# Patient Record
Sex: FEMALE | Race: WHITE | NOT HISPANIC OR LATINO | Employment: OTHER | URBAN - METROPOLITAN AREA
[De-identification: names, ages, dates, MRNs, and addresses within clinical notes are randomized per-mention and may not be internally consistent; named-entity substitution may affect disease eponyms.]

---

## 2017-12-12 ENCOUNTER — TRANSCRIBE ORDERS (OUTPATIENT)
Dept: ADMINISTRATIVE | Facility: HOSPITAL | Age: 67
End: 2017-12-12

## 2017-12-12 DIAGNOSIS — Z12.31 VISIT FOR SCREENING MAMMOGRAM: Primary | ICD-10-CM

## 2017-12-14 ENCOUNTER — HOSPITAL ENCOUNTER (OUTPATIENT)
Dept: MAMMOGRAPHY | Facility: HOSPITAL | Age: 67
Discharge: HOME | End: 2017-12-14
Admitting: INTERNAL MEDICINE

## 2017-12-14 DIAGNOSIS — Z12.31 VISIT FOR SCREENING MAMMOGRAM: ICD-10-CM

## 2017-12-14 PROCEDURE — 77063 BREAST TOMOSYNTHESIS BI: CPT

## 2017-12-14 PROCEDURE — G0202 SCR MAMMO BI INCL CAD: HCPCS

## 2017-12-14 PROCEDURE — 77063 BREAST TOMOSYNTHESIS BI: CPT | Performed by: RADIOLOGY

## 2017-12-14 PROCEDURE — G0202 SCR MAMMO BI INCL CAD: HCPCS | Performed by: RADIOLOGY

## 2018-02-05 ENCOUNTER — HOSPITAL ENCOUNTER (OUTPATIENT)
Dept: ULTRASOUND IMAGING | Facility: HOSPITAL | Age: 68
Discharge: HOME | End: 2018-02-05

## 2018-02-05 ENCOUNTER — HOSPITAL ENCOUNTER (OUTPATIENT)
Dept: MAMMOGRAPHY | Facility: HOSPITAL | Age: 68
Discharge: HOME | End: 2018-02-05
Admitting: INTERNAL MEDICINE

## 2018-02-05 ENCOUNTER — TRANSCRIBE ORDERS (OUTPATIENT)
Dept: MAMMOGRAPHY | Facility: HOSPITAL | Age: 68
End: 2018-02-05

## 2018-02-05 DIAGNOSIS — R92.8 ABNORMAL MAMMOGRAM: Primary | ICD-10-CM

## 2018-02-05 DIAGNOSIS — R92.8 ABNORMAL MAMMOGRAM: ICD-10-CM

## 2018-02-05 PROCEDURE — G0279 TOMOSYNTHESIS, MAMMO: HCPCS

## 2018-02-05 PROCEDURE — G0279 TOMOSYNTHESIS, MAMMO: HCPCS | Performed by: RADIOLOGY

## 2018-02-05 PROCEDURE — 76642 ULTRASOUND BREAST LIMITED: CPT | Performed by: RADIOLOGY

## 2018-02-05 PROCEDURE — 76642 ULTRASOUND BREAST LIMITED: CPT

## 2018-02-05 PROCEDURE — 77066 DX MAMMO INCL CAD BI: CPT | Performed by: RADIOLOGY

## 2018-02-05 PROCEDURE — 77066 DX MAMMO INCL CAD BI: CPT

## 2018-08-07 ENCOUNTER — TRANSCRIBE ORDERS (OUTPATIENT)
Dept: MAMMOGRAPHY | Facility: HOSPITAL | Age: 68
End: 2018-08-07

## 2018-08-07 ENCOUNTER — HOSPITAL ENCOUNTER (OUTPATIENT)
Dept: MAMMOGRAPHY | Facility: HOSPITAL | Age: 68
Discharge: HOME | End: 2018-08-07
Admitting: INTERNAL MEDICINE

## 2018-08-07 DIAGNOSIS — R92.8 ABNORMAL MAMMOGRAM: Primary | ICD-10-CM

## 2018-08-07 DIAGNOSIS — R92.8 ABNORMAL MAMMOGRAM: ICD-10-CM

## 2018-08-07 PROCEDURE — 77066 DX MAMMO INCL CAD BI: CPT | Performed by: RADIOLOGY

## 2018-08-07 PROCEDURE — 77066 DX MAMMO INCL CAD BI: CPT

## 2019-02-08 ENCOUNTER — APPOINTMENT (OUTPATIENT)
Dept: MAMMOGRAPHY | Facility: HOSPITAL | Age: 69
End: 2019-02-08
Attending: RADIOLOGY

## 2019-05-01 ENCOUNTER — HOSPITAL ENCOUNTER (OUTPATIENT)
Dept: MAMMOGRAPHY | Facility: HOSPITAL | Age: 69
Discharge: HOME | End: 2019-05-01
Attending: RADIOLOGY | Admitting: RADIOLOGY

## 2019-05-01 DIAGNOSIS — R92.8 ABNORMAL MAMMOGRAM: ICD-10-CM

## 2019-05-01 PROCEDURE — 77066 DX MAMMO INCL CAD BI: CPT

## 2019-05-01 PROCEDURE — G0279 TOMOSYNTHESIS, MAMMO: HCPCS | Performed by: RADIOLOGY

## 2019-05-01 PROCEDURE — G0279 TOMOSYNTHESIS, MAMMO: HCPCS

## 2019-05-01 PROCEDURE — 77066 DX MAMMO INCL CAD BI: CPT | Performed by: RADIOLOGY

## 2020-08-10 ENCOUNTER — TRANSCRIBE ORDERS (OUTPATIENT)
Dept: ADMINISTRATIVE | Facility: HOSPITAL | Age: 70
End: 2020-08-10

## 2020-08-10 DIAGNOSIS — R92.8 ABNORMAL MAMMOGRAPHY: Primary | ICD-10-CM

## 2020-10-09 ENCOUNTER — APPOINTMENT (OUTPATIENT)
Dept: PREADMISSION TESTING | Facility: HOSPITAL | Age: 70
End: 2020-10-09

## 2020-10-13 ENCOUNTER — HOSPITAL ENCOUNTER (OUTPATIENT)
Dept: MAMMOGRAPHY | Facility: HOSPITAL | Age: 70
Discharge: HOME | End: 2020-10-13
Admitting: INTERNAL MEDICINE

## 2020-10-13 DIAGNOSIS — R92.8 ABNORMAL MAMMOGRAPHY: ICD-10-CM

## 2020-10-13 PROCEDURE — G0279 TOMOSYNTHESIS, MAMMO: HCPCS | Performed by: RADIOLOGY

## 2020-10-13 PROCEDURE — G0279 TOMOSYNTHESIS, MAMMO: HCPCS

## 2020-10-13 PROCEDURE — 77066 DX MAMMO INCL CAD BI: CPT

## 2020-10-13 PROCEDURE — 77066 DX MAMMO INCL CAD BI: CPT | Performed by: RADIOLOGY

## 2020-10-14 ENCOUNTER — HOSPITAL ENCOUNTER (OUTPATIENT)
Dept: GENERAL RADIOLOGY | Facility: HOSPITAL | Age: 70
Discharge: HOME | End: 2020-10-14

## 2020-10-14 ENCOUNTER — APPOINTMENT (OUTPATIENT)
Dept: PREADMISSION TESTING | Facility: HOSPITAL | Age: 70
End: 2020-10-14

## 2020-10-14 VITALS — BODY MASS INDEX: 36.65 KG/M2 | HEIGHT: 65 IN | WEIGHT: 220 LBS

## 2020-10-14 LAB
ANION GAP SERPL CALCULATED.3IONS-SCNC: 9 MMOL/L (ref 5–15)
BUN SERPL-MCNC: 15 MG/DL (ref 8–23)
BUN/CREAT SERPL: 19 (ref 7–25)
CALCIUM SPEC-SCNC: 10.3 MG/DL (ref 8.6–10.5)
CHLORIDE SERPL-SCNC: 101 MMOL/L (ref 98–107)
CO2 SERPL-SCNC: 30 MMOL/L (ref 22–29)
CREAT SERPL-MCNC: 0.79 MG/DL (ref 0.57–1)
DEPRECATED RDW RBC AUTO: 50.9 FL (ref 37–54)
ERYTHROCYTE [DISTWIDTH] IN BLOOD BY AUTOMATED COUNT: 12.7 % (ref 12.3–15.4)
GFR SERPL CREATININE-BSD FRML MDRD: 72 ML/MIN/1.73
GLUCOSE SERPL-MCNC: 69 MG/DL (ref 65–99)
HBA1C MFR BLD: 5.4 % (ref 4.8–5.6)
HCT VFR BLD AUTO: 41 % (ref 34–46.6)
HGB BLD-MCNC: 13.7 G/DL (ref 12–15.9)
MCH RBC QN AUTO: 36.1 PG (ref 26.6–33)
MCHC RBC AUTO-ENTMCNC: 33.4 G/DL (ref 31.5–35.7)
MCV RBC AUTO: 107.9 FL (ref 79–97)
PLATELET # BLD AUTO: 228 10*3/MM3 (ref 140–450)
PMV BLD AUTO: 9.9 FL (ref 6–12)
POTASSIUM SERPL-SCNC: 4.5 MMOL/L (ref 3.5–5.2)
RBC # BLD AUTO: 3.8 10*6/MM3 (ref 3.77–5.28)
SODIUM SERPL-SCNC: 140 MMOL/L (ref 136–145)
WBC # BLD AUTO: 6.38 10*3/MM3 (ref 3.4–10.8)

## 2020-10-14 PROCEDURE — 93010 ELECTROCARDIOGRAM REPORT: CPT | Performed by: INTERNAL MEDICINE

## 2020-10-14 PROCEDURE — 71046 X-RAY EXAM CHEST 2 VIEWS: CPT

## 2020-10-14 PROCEDURE — 36415 COLL VENOUS BLD VENIPUNCTURE: CPT

## 2020-10-14 PROCEDURE — 80048 BASIC METABOLIC PNL TOTAL CA: CPT | Performed by: ORTHOPAEDIC SURGERY

## 2020-10-14 PROCEDURE — 85027 COMPLETE CBC AUTOMATED: CPT | Performed by: ORTHOPAEDIC SURGERY

## 2020-10-14 PROCEDURE — 83036 HEMOGLOBIN GLYCOSYLATED A1C: CPT | Performed by: ORTHOPAEDIC SURGERY

## 2020-10-14 PROCEDURE — 93005 ELECTROCARDIOGRAM TRACING: CPT

## 2020-10-14 RX ORDER — PREGABALIN 150 MG/1
150 CAPSULE ORAL 2 TIMES DAILY
COMMUNITY

## 2020-10-14 RX ORDER — IRBESARTAN AND HYDROCHLOROTHIAZIDE 300; 12.5 MG/1; MG/1
1 TABLET, FILM COATED ORAL DAILY
COMMUNITY

## 2020-10-14 RX ORDER — OXYCODONE AND ACETAMINOPHEN 7.5; 325 MG/1; MG/1
1 TABLET ORAL EVERY 6 HOURS PRN
COMMUNITY
End: 2020-10-23 | Stop reason: HOSPADM

## 2020-10-14 RX ORDER — CELECOXIB 200 MG/1
200 CAPSULE ORAL 2 TIMES DAILY
Status: ON HOLD | COMMUNITY
End: 2020-10-23 | Stop reason: SDUPTHER

## 2020-10-14 RX ORDER — TIZANIDINE 4 MG/1
4 TABLET ORAL NIGHTLY PRN
COMMUNITY

## 2020-10-14 RX ORDER — DULOXETIN HYDROCHLORIDE 60 MG/1
60 CAPSULE, DELAYED RELEASE ORAL DAILY
COMMUNITY

## 2020-10-14 RX ORDER — FENOFIBRATE 160 MG/1
160 TABLET ORAL DAILY
COMMUNITY

## 2020-10-14 RX ORDER — CHLORAL HYDRATE 500 MG
1000 CAPSULE ORAL
COMMUNITY

## 2020-10-14 RX ORDER — MULTIVIT WITH MINERALS/LUTEIN
1000 TABLET ORAL DAILY
COMMUNITY

## 2020-10-14 RX ORDER — POTASSIUM CHLORIDE 20 MEQ/1
20 TABLET, EXTENDED RELEASE ORAL DAILY
COMMUNITY

## 2020-10-14 RX ORDER — VALACYCLOVIR HYDROCHLORIDE 500 MG/1
500 TABLET, FILM COATED ORAL 2 TIMES DAILY
COMMUNITY

## 2020-10-14 RX ORDER — OMEPRAZOLE 40 MG/1
40 CAPSULE, DELAYED RELEASE ORAL DAILY
COMMUNITY

## 2020-10-14 ASSESSMENT — KOOS JR
KOOS JR SCORE: 39.625
KOOS JR SCORE: 19

## 2020-10-14 NOTE — PAT
Patient to apply Chlorhexadine wipes  to surgical area (as instructed) the night before procedure and the AM of procedure. Wipes provided.    Patient instructed to drink 20 ounces (or until full) of Gatorade and it needs to be completed 1 hour before given arrival time for procedure (NO RED Gatorade)    Patient verbalized understanding.    Per Anesthesia Request, patient instructed not to take their ACE/ARB medications on the AM of surgery.    Patient did not attend joint class during PAT visit because the in person class has been temporarily suspended due to COVID 19 restrictions.   Joint replacement handbook given to patient during PAT visit if they have not received a copy within the last one to two years.  Encouraged patient/family to read handbook thoroughly and to notify PAT staff with any questions or concerns.  Additionally a handout was provided directing patient to links to watch online videos related to joint replacement surgery on the [facility] website.  The handout also gives detailed instructions for joining an online joint replacement class that is offered on Tuesdays and Thursdays.  If patient agreed to watch the joint replacement teaching videos and/or join an online joint replacement class then joint replacement instruction guide not reviewed. But if patient would not agree to watch videos or join a class, the nurse guide that reviews highlights of the joint replacement class was reviewed with patient during PAT visit. Patient verbalized understanding.     EKG reviewed and cleared by Dr. Romero

## 2020-10-20 ENCOUNTER — APPOINTMENT (OUTPATIENT)
Dept: PREADMISSION TESTING | Facility: HOSPITAL | Age: 70
End: 2020-10-20

## 2020-10-20 LAB — SARS-COV-2 RNA RESP QL NAA+PROBE: NOT DETECTED

## 2020-10-20 PROCEDURE — C9803 HOPD COVID-19 SPEC COLLECT: HCPCS

## 2020-10-20 PROCEDURE — U0004 COV-19 TEST NON-CDC HGH THRU: HCPCS

## 2020-10-21 ENCOUNTER — ANESTHESIA EVENT (OUTPATIENT)
Dept: PERIOP | Facility: HOSPITAL | Age: 70
End: 2020-10-21

## 2020-10-21 RX ORDER — SODIUM CHLORIDE 0.9 % (FLUSH) 0.9 %
10 SYRINGE (ML) INJECTION AS NEEDED
Status: CANCELLED | OUTPATIENT
Start: 2020-10-21

## 2020-10-21 RX ORDER — SODIUM CHLORIDE 0.9 % (FLUSH) 0.9 %
10 SYRINGE (ML) INJECTION EVERY 12 HOURS SCHEDULED
Status: CANCELLED | OUTPATIENT
Start: 2020-10-21

## 2020-10-21 RX ORDER — FAMOTIDINE 10 MG/ML
20 INJECTION, SOLUTION INTRAVENOUS ONCE
Status: CANCELLED | OUTPATIENT
Start: 2020-10-21 | End: 2020-10-21

## 2020-10-22 ENCOUNTER — HOSPITAL ENCOUNTER (OUTPATIENT)
Facility: HOSPITAL | Age: 70
Discharge: HOME | End: 2020-10-23
Attending: ORTHOPAEDIC SURGERY | Admitting: ORTHOPAEDIC SURGERY

## 2020-10-22 ENCOUNTER — ANESTHESIA (OUTPATIENT)
Dept: PERIOP | Facility: HOSPITAL | Age: 70
End: 2020-10-22

## 2020-10-22 ENCOUNTER — APPOINTMENT (OUTPATIENT)
Dept: GENERAL RADIOLOGY | Facility: HOSPITAL | Age: 70
End: 2020-10-22

## 2020-10-22 DIAGNOSIS — Z96.652 STATUS POST TOTAL LEFT KNEE REPLACEMENT: Primary | ICD-10-CM

## 2020-10-22 PROBLEM — K21.9 GERD (GASTROESOPHAGEAL REFLUX DISEASE): Status: ACTIVE | Noted: 2020-10-22

## 2020-10-22 PROBLEM — M17.12 PRIMARY OSTEOARTHRITIS OF LEFT KNEE: Status: ACTIVE | Noted: 2020-10-22

## 2020-10-22 PROBLEM — E78.5 HYPERLIPIDEMIA: Status: ACTIVE | Noted: 2020-10-22

## 2020-10-22 PROBLEM — I10 HYPERTENSION: Status: ACTIVE | Noted: 2020-10-22

## 2020-10-22 LAB
POTASSIUM SERPL-SCNC: 4.1 MMOL/L (ref 3.5–5.2)
POTASSIUM SERPL-SCNC: 4.7 MMOL/L (ref 3.5–5.2)

## 2020-10-22 PROCEDURE — 25010000002 PROPOFOL 10 MG/ML EMULSION: Performed by: NURSE ANESTHETIST, CERTIFIED REGISTERED

## 2020-10-22 PROCEDURE — C1713 ANCHOR/SCREW BN/BN,TIS/BN: HCPCS | Performed by: ORTHOPAEDIC SURGERY

## 2020-10-22 PROCEDURE — 25010000002 DEXAMETHASONE PER 1 MG: Performed by: NURSE ANESTHETIST, CERTIFIED REGISTERED

## 2020-10-22 PROCEDURE — 25010000003 CEFAZOLIN IN DEXTROSE 2-4 GM/100ML-% SOLUTION: Performed by: ORTHOPAEDIC SURGERY

## 2020-10-22 PROCEDURE — A9270 NON-COVERED ITEM OR SERVICE: HCPCS | Performed by: ORTHOPAEDIC SURGERY

## 2020-10-22 PROCEDURE — 63710000001 PREGABALIN 150 MG CAPSULE: Performed by: INTERNAL MEDICINE

## 2020-10-22 PROCEDURE — A9270 NON-COVERED ITEM OR SERVICE: HCPCS | Performed by: NURSE PRACTITIONER

## 2020-10-22 PROCEDURE — C1776 JOINT DEVICE (IMPLANTABLE): HCPCS | Performed by: ORTHOPAEDIC SURGERY

## 2020-10-22 PROCEDURE — 63710000001 LOSARTAN 50 MG TABLET: Performed by: NURSE PRACTITIONER

## 2020-10-22 PROCEDURE — 25010000003 MORPHINE PER 10 MG: Performed by: ORTHOPAEDIC SURGERY

## 2020-10-22 PROCEDURE — 97116 GAIT TRAINING THERAPY: CPT

## 2020-10-22 PROCEDURE — A9270 NON-COVERED ITEM OR SERVICE: HCPCS | Performed by: INTERNAL MEDICINE

## 2020-10-22 PROCEDURE — 84132 ASSAY OF SERUM POTASSIUM: CPT | Performed by: ANESTHESIOLOGY

## 2020-10-22 PROCEDURE — 63710000001 PANTOPRAZOLE 40 MG TABLET DELAYED-RELEASE: Performed by: NURSE PRACTITIONER

## 2020-10-22 PROCEDURE — 97161 PT EVAL LOW COMPLEX 20 MIN: CPT

## 2020-10-22 PROCEDURE — 73560 X-RAY EXAM OF KNEE 1 OR 2: CPT

## 2020-10-22 PROCEDURE — 25010000002 ROPIVACAINE PER 1 MG: Performed by: ORTHOPAEDIC SURGERY

## 2020-10-22 PROCEDURE — 25010000002 HYDROMORPHONE PER 4 MG: Performed by: NURSE ANESTHETIST, CERTIFIED REGISTERED

## 2020-10-22 PROCEDURE — 25010000002 ONDANSETRON PER 1 MG: Performed by: NURSE ANESTHETIST, CERTIFIED REGISTERED

## 2020-10-22 PROCEDURE — 63710000001 ACETAMINOPHEN 500 MG TABLET: Performed by: ORTHOPAEDIC SURGERY

## 2020-10-22 PROCEDURE — 84132 ASSAY OF SERUM POTASSIUM: CPT | Performed by: ORTHOPAEDIC SURGERY

## 2020-10-22 PROCEDURE — 25010000002 ROPIVACAINE PER 1 MG: Performed by: NURSE ANESTHETIST, CERTIFIED REGISTERED

## 2020-10-22 PROCEDURE — 63710000001 DULOXETINE 60 MG CAPSULE DELAYED-RELEASE PARTICLES: Performed by: NURSE PRACTITIONER

## 2020-10-22 PROCEDURE — 63710000001 OXYCODONE 5 MG TABLET: Performed by: ORTHOPAEDIC SURGERY

## 2020-10-22 PROCEDURE — 63710000001 MELOXICAM 7.5 MG TABLET: Performed by: ORTHOPAEDIC SURGERY

## 2020-10-22 DEVICE — DEV CONTRL TISS STRATAFIX SPIRAL PGPCL 2/0FS 30X30CM: Type: IMPLANTABLE DEVICE | Site: KNEE | Status: FUNCTIONAL

## 2020-10-22 DEVICE — CMT BONE PALACOS R HI/VISC 1X40: Type: IMPLANTABLE DEVICE | Site: KNEE | Status: FUNCTIONAL

## 2020-10-22 DEVICE — GEN II 7.5MM RESUR PAT 29MM
Type: IMPLANTABLE DEVICE | Site: KNEE | Status: FUNCTIONAL
Brand: GENESIS II

## 2020-10-22 DEVICE — LEGION PS OXINIUM FEMORAL SZ 4 LEFT
Type: IMPLANTABLE DEVICE | Site: KNEE | Status: FUNCTIONAL
Brand: LEGION

## 2020-10-22 DEVICE — GENESIS II NON-POROUS TIBIAL                                    BASEPLATE SIZE 3 LEFT
Type: IMPLANTABLE DEVICE | Site: KNEE | Status: FUNCTIONAL
Brand: GENESIS II

## 2020-10-22 DEVICE — IMPLANTABLE DEVICE: Type: IMPLANTABLE DEVICE | Site: KNEE | Status: FUNCTIONAL

## 2020-10-22 DEVICE — DEV CONTRL TISS STRATAFIX SYMM PDS PLUS VIL CT-1 45CM: Type: IMPLANTABLE DEVICE | Site: KNEE | Status: FUNCTIONAL

## 2020-10-22 DEVICE — LEGION POSTERIOR STABILIZED HIGH                                    FLEX HIGHLY CROSS LINKED                                    POLYETHYLENE SIZE 3-4 9MM
Type: IMPLANTABLE DEVICE | Site: KNEE | Status: FUNCTIONAL
Brand: LEGION

## 2020-10-22 RX ORDER — BUPIVACAINE HYDROCHLORIDE 2.5 MG/ML
INJECTION, SOLUTION EPIDURAL; INFILTRATION; INTRACAUDAL
Status: COMPLETED | OUTPATIENT
Start: 2020-10-22 | End: 2020-10-22

## 2020-10-22 RX ORDER — ACETAMINOPHEN 500 MG
1000 TABLET ORAL 3 TIMES DAILY
Status: DISCONTINUED | OUTPATIENT
Start: 2020-10-22 | End: 2020-10-23 | Stop reason: HOSPADM

## 2020-10-22 RX ORDER — BISACODYL 5 MG/1
10 TABLET, DELAYED RELEASE ORAL DAILY PRN
Status: DISCONTINUED | OUTPATIENT
Start: 2020-10-22 | End: 2020-10-23 | Stop reason: HOSPADM

## 2020-10-22 RX ORDER — FENTANYL CITRATE 50 UG/ML
50 INJECTION, SOLUTION INTRAMUSCULAR; INTRAVENOUS
Status: DISCONTINUED | OUTPATIENT
Start: 2020-10-22 | End: 2020-10-22

## 2020-10-22 RX ORDER — OXYCODONE HYDROCHLORIDE 10 MG/1
10 TABLET ORAL EVERY 4 HOURS PRN
Qty: 40 TABLET | Refills: 0 | Status: SHIPPED | OUTPATIENT
Start: 2020-10-22 | End: 2020-10-30

## 2020-10-22 RX ORDER — ONDANSETRON 2 MG/ML
INJECTION INTRAMUSCULAR; INTRAVENOUS AS NEEDED
Status: DISCONTINUED | OUTPATIENT
Start: 2020-10-22 | End: 2020-10-22 | Stop reason: SURG

## 2020-10-22 RX ORDER — BUPIVACAINE HYDROCHLORIDE 5 MG/ML
INJECTION, SOLUTION PERINEURAL
Status: COMPLETED | OUTPATIENT
Start: 2020-10-22 | End: 2020-10-22

## 2020-10-22 RX ORDER — AMOXICILLIN 250 MG
2 CAPSULE ORAL 2 TIMES DAILY PRN
Status: DISCONTINUED | OUTPATIENT
Start: 2020-10-22 | End: 2020-10-23 | Stop reason: HOSPADM

## 2020-10-22 RX ORDER — LIDOCAINE HYDROCHLORIDE 10 MG/ML
0.5 INJECTION, SOLUTION EPIDURAL; INFILTRATION; INTRACAUDAL; PERINEURAL ONCE AS NEEDED
Status: COMPLETED | OUTPATIENT
Start: 2020-10-22 | End: 2020-10-22

## 2020-10-22 RX ORDER — MELOXICAM 7.5 MG/1
15 TABLET ORAL DAILY
Status: DISCONTINUED | OUTPATIENT
Start: 2020-10-22 | End: 2020-10-23 | Stop reason: HOSPADM

## 2020-10-22 RX ORDER — PREGABALIN 75 MG/1
75 CAPSULE ORAL ONCE
Status: COMPLETED | OUTPATIENT
Start: 2020-10-22 | End: 2020-10-22

## 2020-10-22 RX ORDER — SODIUM CHLORIDE 9 MG/ML
100 INJECTION, SOLUTION INTRAVENOUS CONTINUOUS
Status: DISCONTINUED | OUTPATIENT
Start: 2020-10-22 | End: 2020-10-23 | Stop reason: HOSPADM

## 2020-10-22 RX ORDER — CEFAZOLIN SODIUM 2 G/100ML
2 INJECTION, SOLUTION INTRAVENOUS ONCE
Status: COMPLETED | OUTPATIENT
Start: 2020-10-22 | End: 2020-10-22

## 2020-10-22 RX ORDER — ASPIRIN 325 MG
325 TABLET, DELAYED RELEASE (ENTERIC COATED) ORAL DAILY
Status: DISCONTINUED | OUTPATIENT
Start: 2020-10-23 | End: 2020-10-23 | Stop reason: HOSPADM

## 2020-10-22 RX ORDER — ONDANSETRON 2 MG/ML
4 INJECTION INTRAMUSCULAR; INTRAVENOUS EVERY 6 HOURS PRN
Status: DISCONTINUED | OUTPATIENT
Start: 2020-10-22 | End: 2020-10-23 | Stop reason: HOSPADM

## 2020-10-22 RX ORDER — LABETALOL HYDROCHLORIDE 5 MG/ML
10 INJECTION, SOLUTION INTRAVENOUS EVERY 4 HOURS PRN
Status: DISCONTINUED | OUTPATIENT
Start: 2020-10-22 | End: 2020-10-23 | Stop reason: HOSPADM

## 2020-10-22 RX ORDER — DOCUSATE SODIUM 100 MG/1
100 CAPSULE, LIQUID FILLED ORAL 2 TIMES DAILY PRN
Status: DISCONTINUED | OUTPATIENT
Start: 2020-10-22 | End: 2020-10-23 | Stop reason: HOSPADM

## 2020-10-22 RX ORDER — OXYCODONE HYDROCHLORIDE 5 MG/1
10 TABLET ORAL EVERY 4 HOURS PRN
Status: DISCONTINUED | OUTPATIENT
Start: 2020-10-22 | End: 2020-10-23 | Stop reason: HOSPADM

## 2020-10-22 RX ORDER — NALOXONE HCL 0.4 MG/ML
0.1 VIAL (ML) INJECTION
Status: DISCONTINUED | OUTPATIENT
Start: 2020-10-22 | End: 2020-10-23 | Stop reason: HOSPADM

## 2020-10-22 RX ORDER — SODIUM CHLORIDE, SODIUM LACTATE, POTASSIUM CHLORIDE, CALCIUM CHLORIDE 600; 310; 30; 20 MG/100ML; MG/100ML; MG/100ML; MG/100ML
9 INJECTION, SOLUTION INTRAVENOUS CONTINUOUS
Status: DISCONTINUED | OUTPATIENT
Start: 2020-10-22 | End: 2020-10-23 | Stop reason: HOSPADM

## 2020-10-22 RX ORDER — LOSARTAN POTASSIUM 50 MG/1
100 TABLET ORAL
Status: DISCONTINUED | OUTPATIENT
Start: 2020-10-22 | End: 2020-10-23 | Stop reason: HOSPADM

## 2020-10-22 RX ORDER — HYDROMORPHONE HYDROCHLORIDE 1 MG/ML
0.5 INJECTION, SOLUTION INTRAMUSCULAR; INTRAVENOUS; SUBCUTANEOUS
Status: DISCONTINUED | OUTPATIENT
Start: 2020-10-22 | End: 2020-10-22

## 2020-10-22 RX ORDER — DIPHENHYDRAMINE HYDROCHLORIDE 50 MG/ML
25 INJECTION INTRAMUSCULAR; INTRAVENOUS EVERY 6 HOURS PRN
Status: DISCONTINUED | OUTPATIENT
Start: 2020-10-22 | End: 2020-10-23 | Stop reason: HOSPADM

## 2020-10-22 RX ORDER — TIZANIDINE 4 MG/1
4 TABLET ORAL NIGHTLY PRN
Status: DISCONTINUED | OUTPATIENT
Start: 2020-10-22 | End: 2020-10-23 | Stop reason: HOSPADM

## 2020-10-22 RX ORDER — PROPOFOL 10 MG/ML
VIAL (ML) INTRAVENOUS AS NEEDED
Status: DISCONTINUED | OUTPATIENT
Start: 2020-10-22 | End: 2020-10-22 | Stop reason: SURG

## 2020-10-22 RX ORDER — ONDANSETRON 4 MG/1
4 TABLET, FILM COATED ORAL EVERY 6 HOURS PRN
Status: DISCONTINUED | OUTPATIENT
Start: 2020-10-22 | End: 2020-10-23 | Stop reason: HOSPADM

## 2020-10-22 RX ORDER — KETOROLAC TROMETHAMINE 15 MG/ML
15 INJECTION, SOLUTION INTRAMUSCULAR; INTRAVENOUS EVERY 6 HOURS PRN
Status: DISCONTINUED | OUTPATIENT
Start: 2020-10-22 | End: 2020-10-23 | Stop reason: HOSPADM

## 2020-10-22 RX ORDER — BISACODYL 10 MG
10 SUPPOSITORY, RECTAL RECTAL DAILY PRN
Status: DISCONTINUED | OUTPATIENT
Start: 2020-10-22 | End: 2020-10-23 | Stop reason: HOSPADM

## 2020-10-22 RX ORDER — DIPHENHYDRAMINE HCL 25 MG
25 CAPSULE ORAL EVERY 6 HOURS PRN
Status: DISCONTINUED | OUTPATIENT
Start: 2020-10-22 | End: 2020-10-23 | Stop reason: HOSPADM

## 2020-10-22 RX ORDER — DULOXETIN HYDROCHLORIDE 60 MG/1
60 CAPSULE, DELAYED RELEASE ORAL DAILY
Status: DISCONTINUED | OUTPATIENT
Start: 2020-10-22 | End: 2020-10-23 | Stop reason: HOSPADM

## 2020-10-22 RX ORDER — MAGNESIUM HYDROXIDE 1200 MG/15ML
LIQUID ORAL AS NEEDED
Status: DISCONTINUED | OUTPATIENT
Start: 2020-10-22 | End: 2020-10-22 | Stop reason: HOSPADM

## 2020-10-22 RX ORDER — PANTOPRAZOLE SODIUM 40 MG/1
40 TABLET, DELAYED RELEASE ORAL EVERY MORNING
Status: DISCONTINUED | OUTPATIENT
Start: 2020-10-22 | End: 2020-10-23 | Stop reason: HOSPADM

## 2020-10-22 RX ORDER — PREGABALIN 150 MG/1
150 CAPSULE ORAL EVERY 12 HOURS SCHEDULED
Status: DISCONTINUED | OUTPATIENT
Start: 2020-10-22 | End: 2020-10-23 | Stop reason: HOSPADM

## 2020-10-22 RX ORDER — ALBUTEROL SULFATE 2.5 MG/3ML
2.5 SOLUTION RESPIRATORY (INHALATION) ONCE AS NEEDED
Status: DISCONTINUED | OUTPATIENT
Start: 2020-10-22 | End: 2020-10-22

## 2020-10-22 RX ORDER — CEFAZOLIN SODIUM 2 G/100ML
2 INJECTION, SOLUTION INTRAVENOUS EVERY 8 HOURS
Status: COMPLETED | OUTPATIENT
Start: 2020-10-22 | End: 2020-10-23

## 2020-10-22 RX ORDER — DEXAMETHASONE SODIUM PHOSPHATE 4 MG/ML
INJECTION, SOLUTION INTRA-ARTICULAR; INTRALESIONAL; INTRAMUSCULAR; INTRAVENOUS; SOFT TISSUE AS NEEDED
Status: DISCONTINUED | OUTPATIENT
Start: 2020-10-22 | End: 2020-10-22 | Stop reason: SURG

## 2020-10-22 RX ORDER — ACETAMINOPHEN 500 MG
1000 TABLET ORAL ONCE
Status: COMPLETED | OUTPATIENT
Start: 2020-10-22 | End: 2020-10-22

## 2020-10-22 RX ORDER — LIDOCAINE HYDROCHLORIDE 10 MG/ML
INJECTION, SOLUTION EPIDURAL; INFILTRATION; INTRACAUDAL; PERINEURAL AS NEEDED
Status: DISCONTINUED | OUTPATIENT
Start: 2020-10-22 | End: 2020-10-22 | Stop reason: SURG

## 2020-10-22 RX ORDER — OXYCODONE HYDROCHLORIDE 5 MG/1
5 TABLET ORAL EVERY 4 HOURS PRN
Status: DISCONTINUED | OUTPATIENT
Start: 2020-10-22 | End: 2020-10-23 | Stop reason: HOSPADM

## 2020-10-22 RX ORDER — FAMOTIDINE 20 MG/1
20 TABLET, FILM COATED ORAL ONCE
Status: COMPLETED | OUTPATIENT
Start: 2020-10-22 | End: 2020-10-22

## 2020-10-22 RX ADMIN — TRANEXAMIC ACID 1000 MG: 100 INJECTION, SOLUTION INTRAVENOUS at 08:36

## 2020-10-22 RX ADMIN — HYDROMORPHONE HYDROCHLORIDE 0.5 MG: 1 INJECTION, SOLUTION INTRAMUSCULAR; INTRAVENOUS; SUBCUTANEOUS at 10:30

## 2020-10-22 RX ADMIN — PROPOFOL 40 MG: 10 INJECTION, EMULSION INTRAVENOUS at 07:33

## 2020-10-22 RX ADMIN — LOSARTAN POTASSIUM 100 MG: 50 TABLET, FILM COATED ORAL at 13:40

## 2020-10-22 RX ADMIN — PROPOFOL 60 MG: 10 INJECTION, EMULSION INTRAVENOUS at 07:30

## 2020-10-22 RX ADMIN — SODIUM CHLORIDE, POTASSIUM CHLORIDE, SODIUM LACTATE AND CALCIUM CHLORIDE 9 ML/HR: 600; 310; 30; 20 INJECTION, SOLUTION INTRAVENOUS at 06:57

## 2020-10-22 RX ADMIN — FAMOTIDINE 20 MG: 20 TABLET, FILM COATED ORAL at 06:57

## 2020-10-22 RX ADMIN — LIDOCAINE HYDROCHLORIDE 50 MG: 10 INJECTION, SOLUTION EPIDURAL; INFILTRATION; INTRACAUDAL; PERINEURAL at 07:30

## 2020-10-22 RX ADMIN — ROPIVACAINE HYDROCHLORIDE 10 ML/HR: 5 INJECTION, SOLUTION EPIDURAL; INFILTRATION; PERINEURAL at 09:16

## 2020-10-22 RX ADMIN — DEXAMETHASONE SODIUM PHOSPHATE 8 MG: 4 INJECTION, SOLUTION INTRAMUSCULAR; INTRAVENOUS at 07:43

## 2020-10-22 RX ADMIN — ONDANSETRON 4 MG: 2 INJECTION INTRAMUSCULAR; INTRAVENOUS at 08:47

## 2020-10-22 RX ADMIN — PREGABALIN 150 MG: 150 CAPSULE ORAL at 13:40

## 2020-10-22 RX ADMIN — SODIUM CHLORIDE 100 ML/HR: 9 INJECTION, SOLUTION INTRAVENOUS at 11:44

## 2020-10-22 RX ADMIN — PREGABALIN 75 MG: 75 CAPSULE ORAL at 06:57

## 2020-10-22 RX ADMIN — CEFAZOLIN SODIUM 2 G: 2 INJECTION, SOLUTION INTRAVENOUS at 07:26

## 2020-10-22 RX ADMIN — PANTOPRAZOLE SODIUM 40 MG: 40 TABLET, DELAYED RELEASE ORAL at 13:40

## 2020-10-22 RX ADMIN — ACETAMINOPHEN 1000 MG: 500 TABLET, FILM COATED ORAL at 21:41

## 2020-10-22 RX ADMIN — TRANEXAMIC ACID 1000 MG: 100 INJECTION, SOLUTION INTRAVENOUS at 07:39

## 2020-10-22 RX ADMIN — HYDROMORPHONE HYDROCHLORIDE 0.5 MG: 1 INJECTION, SOLUTION INTRAMUSCULAR; INTRAVENOUS; SUBCUTANEOUS at 10:24

## 2020-10-22 RX ADMIN — MELOXICAM 15 MG: 7.5 TABLET ORAL at 12:12

## 2020-10-22 RX ADMIN — OXYCODONE 10 MG: 5 TABLET ORAL at 13:07

## 2020-10-22 RX ADMIN — BUPIVACAINE HYDROCHLORIDE 2 ML: 5 INJECTION, SOLUTION PERINEURAL at 07:31

## 2020-10-22 RX ADMIN — PROPOFOL 85 MCG/KG/MIN: 10 INJECTION, EMULSION INTRAVENOUS at 08:25

## 2020-10-22 RX ADMIN — PROPOFOL 50 MG: 10 INJECTION, EMULSION INTRAVENOUS at 07:55

## 2020-10-22 RX ADMIN — OXYCODONE 10 MG: 5 TABLET ORAL at 17:54

## 2020-10-22 RX ADMIN — LIDOCAINE HYDROCHLORIDE 0.5 ML: 10 INJECTION, SOLUTION EPIDURAL; INFILTRATION; INTRACAUDAL; PERINEURAL at 06:58

## 2020-10-22 RX ADMIN — ACETAMINOPHEN 1000 MG: 500 TABLET, FILM COATED ORAL at 12:12

## 2020-10-22 RX ADMIN — DULOXETINE HYDROCHLORIDE 60 MG: 60 CAPSULE, DELAYED RELEASE ORAL at 13:40

## 2020-10-22 RX ADMIN — ACETAMINOPHEN 1000 MG: 500 TABLET ORAL at 06:57

## 2020-10-22 RX ADMIN — OXYCODONE 10 MG: 5 TABLET ORAL at 21:41

## 2020-10-22 RX ADMIN — CEFAZOLIN SODIUM 2 G: 2 INJECTION, SOLUTION INTRAVENOUS at 16:32

## 2020-10-22 RX ADMIN — ACETAMINOPHEN 1000 MG: 500 TABLET, FILM COATED ORAL at 16:33

## 2020-10-22 RX ADMIN — BUPIVACAINE HYDROCHLORIDE 30 ML: 2.5 INJECTION, SOLUTION EPIDURAL; INFILTRATION; INTRACAUDAL; PERINEURAL at 09:15

## 2020-10-22 RX ADMIN — PREGABALIN 150 MG: 150 CAPSULE ORAL at 21:41

## 2020-10-22 RX ADMIN — PROPOFOL 85 MCG/KG/MIN: 10 INJECTION, EMULSION INTRAVENOUS at 07:35

## 2020-10-22 NOTE — PLAN OF CARE
Goal Outcome Evaluation:  Plan of Care Reviewed With: patient, spouse  Progress: improving   Pt ambulated with PT and up in chair, pain controlled with medication and nerve cath, will continue to monitor

## 2020-10-22 NOTE — OP NOTE
Preoperative diagnosis:Left knee end stage osteoarthritis    Postoperative diagnosis: Left knee end stage osteoarthritis    Operative procedure: Left total knee arthroplasty    Surgeon: Dr. Dylan Enriquez    Assistant: MARCELA Torres.  Necessary during the case for positioning of the patient, exposure, implantation of components and closure.    Anesthesia: Spinal with local and adductor canal catheter    Estimated blood loss: Minimal    Implants: [employer] Legion  posterior stabilized total knee arthroplasty size 4 femur, 3 tibia, 29 thin patella, 9 polyethylene.    Tourniquet time: 66 minutes at 300 mmHg    Indications for procedure: Flory is a very pleasant 70-year-old female who has struggled with end-stage activity limiting left knee pain secondary to osteoarthritis.  X-rays in March revealed severe tricompartmental degenerative changes in a mildly valgus knee with loss of medial and lateral joint space and marginal osteophyte formation.  They have failed exhaustive conservative treatment measures including injections, physical therapy and bracing.  She sees Dr. Serrano for pain management and takes celecoxib, Lyrica and oxycodone.  She has a history of a right knee replacement by Dr. Padilla in 2013 and had a left knee arthroscopy in 2016.  After undergoing a shared decision-making process they agreed to proceed with left total knee arthroplasty.  Surgical consent form was signed.    Description of procedure: The patient was seen in the preoperative holding area and the left leg was signed to confirm the correct operative site.  They were seen by anesthesia.  They received Ancef 2 g IV prophylactic antibiotics within 1 hour of incision time.  They were brought back to the operating room.  Spinal was performed without difficulty and they were given sedation throughout the case.  Patient placed in the supine position and all bony prominences were well-padded.  A bump was placed underneath the left hip.   Nonsterile tourniquet was applied to the thigh.  The left lower extremity was prepped and draped in the usual sterile fashion and timeout was performed to confirm left total knee arthroplasty for patient Flory Haas.    The left lower extremity was exsanguinated with an Esmarch.  Tourniquet was inflated to 300 mmHg.  With the knee flexed a midline incision was made with a 10 blade scalpel.  Adequate hemostasis maintained with Bovie electrocautery.  Full-thickness medial and lateral flaps were elevated.  Using a fresh 10 blade scalpel I made a medial parapatellar arthrotomy.  The patella was everted.  Patella fat pad and anterior femoral fat pads were excised.  Marginal osteophytes were removed.  A limited medial release was performed for this valgus knee using Bovie electrocautery.  Suffolk's line was marked.  Z retractors were placed medially and laterally.  The distal femur was then drilled and intramedullary distal femoral cutting guide was pinned in place set at a 5° valgus cut for a 9.5 mm cut.  This cut was then made with the oscillating saw.  The femur was then sized to a size 4 set at 3° of external rotation.  4-in-1 cutting guide was pinned in place.  Anterior and posterior cuts were made as were the chamfer cuts.  Cut bone was removed.  We then turned our attention to the tibia.    The tibia was subluxed anteriorly. PCL retractor was placed as were medial and lateral Hohmann retractors.  We then used the extramedullary tibial cutting guide set at 3° posterior slope for the proximal tibial cut.  5 mm of bone was removed from the low medial side and a centimeter from the high lateral side.  Medial and lateral menisci were then excised as were posterior osteophytes.  Flexion and extension gaps were then checked and with a 9 mm block we achieved full extension and flexion with excellent alignment. The tibia was sized to a 3 tibial tray centered off the medial third of the tibial tubercle.  The tray was pinned  in place.  We then impacted the tibial fins.  Size 4  trial femur was then placed and box cut was made with the reamer and punch. We trialed with a size 9 polyethylene, 4 femur and 3 tibia.  With these trial components in place we achieved full extension and flexion with excellent alignment and stable throughout.     We then turned our attention to the patella.  Patella was sized to 20 mm in thickness and we made a 7 mm patellar cut with the reciprocal saw.  A 29 thin trial was placed and the patella drill holes were made.  With the trial button in place there was excellent tracking with the no thumbs technique.    Trial components were then removed.  Final components were opened on the back table.  Posterior capsule was injected with 20 mL of half percent ropivacaine and 5 mg of morphine.  Cement was mixed on the back table.  The knee was copiously irrigated with Pulsavac lavage.  The knee was thoroughly dried and a bone plug was placed in the distal femoral drill hole.  Cement was then applied to the tibia and final size 3 tibial tray was impacted in place and excess cement was removed.  Cement was applied to the femur and final size 4 femur was impacted in place and excess cement removed.  We then placed a 9 mm polyethylene-this was seen to be fully seated in the tibial tray.  Knee was then placed in extension and we applied cement to the cut patellar surface and 29 thin patella was held in place with patellar clamp.  All excess cement was removed.  The knee was left in extension while cement cured.    Once the cement was fully cured we irrigated the knee with diluted Betadine solution and Pulsavac lavage.  The knee was taken through full range of motion and seen to achieve full extension and flexion with excellent patellar tracking.    We then proceeded with closure.  The deep fascia was closed with a #1 Stratafix barbed suture.  Dermis was closed with 2-0 Vicryl.  Skin was closed with a running 3-0 Stratafix  barbed subcuticular suture.  A sterile dressing was then applied with Prineo mesh dressing and Dermabond.  We then applied 4 x 4's, ABDs, soft roll and a six-inch Ace bandage.    Tourniquet was deflated at 66 minutes.  Patient was transferred to recovery in stable condition.  All sponge and needle counts were correct ×2.  Patient received first dose of tranexamic acid just prior to incision and the second dose 5-10 minutes prior to letting down the tourniquet to minimize bleeding.    Postoperative plan:  Patient will be admitted to Dr. NAVARRETE for medical management  Mobilize starting today with PT/OT as tolerated  Start aspirin for DVT prophylaxis tomorrow   consult for home physical therapy and home equipment needs  Follow-up with me in 2-3 weeks.    Fernandez Enriquez MD  10/22/20  09:14 EDT     CC: Dr. Aracely Schneider

## 2020-10-22 NOTE — ANESTHESIA PROCEDURE NOTES
Spinal Block      Patient reassessed immediately prior to procedure    Patient location during procedure: OR  Indication:at surgeon's request  Performed By  CRNA: Sonal Emmanuel CRNA  Preanesthetic Checklist  Completed: patient identified, site marked, surgical consent, pre-op evaluation, timeout performed, IV checked, risks and benefits discussed and monitors and equipment checked  Spinal Block Prep:  Patient Position:sitting  Sterile Tech:cap, gloves, sterile barriers and mask  Prep:Chloraprep  Patient Monitoring:continuous pulse oximetry  Spinal Block Procedure  Approach:midline  Guidance:landmark technique and palpation technique  Location:L4-L5  Needle Type:Quincke  Needle Gauge:22 G  Placement of Spinal needle event:cerebrospinal fluid aspirated  Paresthesia: no  Fluid Appearance:clear  Medications: bupivacaine (MARCAINE) 0.5 % injection, 2 mL  Med Administered at 10/22/2020 7:31 AM   Post Assessment  Patient Tolerance:patient tolerated the procedure well with no apparent complications  Complications no  Additional Notes  Procedure:  Pt assisted to sitting position, with legs in position of comfort over side of bed.  Pt. instructed in optimal spine presentation, the spine was prepped/ Draped and the skin at insertion site was anesthetized with 1% Lidocaine 2 ml.  The spinal needle was then advanced until CSF flow was obtained and LA was injected:

## 2020-10-22 NOTE — ANESTHESIA PROCEDURE NOTES
Left Adductor Canal Catheter      Patient reassessed immediately prior to procedure    Patient location during procedure: post-op  Reason for block: at surgeon's request and post-op pain management  Performed by  CRNA: Sonal Emmanuel CRNA  Assisted by: Jodie Reynoso RN  Preanesthetic Checklist  Completed: patient identified, site marked, surgical consent, pre-op evaluation, timeout performed, IV checked, risks and benefits discussed and monitors and equipment checked  Prep:  Pt Position: supine  Sterile barriers:cap, gloves, mask and sterile barriers  Prep: ChloraPrep  Patient monitoring: blood pressure monitoring, continuous pulse oximetry and EKG  Procedure  Performed under: spinal  Guidance:ultrasound guided  Images:still images obtained, printed/placed on chart    Laterality:left  Block Type:adductor canal block  Injection Technique:catheter  Needle Type:Tuohy and echogenic  Needle Gauge:18 G  Resistance on Injection: none  Catheter Size:20 G (20g)  Cath Depth at skin: 8 cm    Medications Used: bupivacaine PF (MARCAINE) 0.25 % injection, 30 mL  Med admintered at 10/22/2020 9:15 AM      Post Assessment  Injection Assessment: negative aspiration for heme, incremental injection and no paresthesia on injection  Patient Tolerance:comfortable throughout block  Complications:no  Additional Notes  Procedure:             The pt was placed in the Supine position.  The Insertion site was  prepped and Draped in sterile fashion.  The pt was anesthetized with  IV Sedation( see meds).  Skin and cutaneous tissue was infiltrated and anesthetized with 1% Lidocaine 3 mls via a 25g needle.  A BBraun 4 inch 18g echogenic needle was then  inserted approximately midline, mid-thigh and advanced In-plane with Ultrasound guidance.  Normal Saline PSF was utilized for hydrodissection of tissue.  The Vastus medialis and Sartorius muscle where visualized and the needle tip was placed in the adductor canal,  lateral to the femoral  artery.  LA injection spread was visualized, injection was incremental 1-5ml, injection pressure was normal or little, no intraneural injection, no vascular injection.  LA dose was injected thru the needle(see dose above).  A BBraun 20g wire stylet catheter was placed via the needle with ultrasound visualization and confirmation with NS fluid bolus. The labeled Catheter was then secured to skin at insertion site with skin afix and steristrips to curled catheter and CHG transparent dressing.  Thank you.

## 2020-10-22 NOTE — ANESTHESIA POSTPROCEDURE EVALUATION
Patient: Flory Haas    Procedure Summary     Date: 10/22/20 Room / Location:  [facility]    Anesthesia Start: 0724 Anesthesia Stop: 0920    Procedure: LEFT TOTAL KNEE ARTHROPLASTY (Left Knee) Diagnosis:       Primary osteoarthritis of left knee      (left knee osteoarthritis)    Surgeon: Fernandez Enriquez MD Provider: Car Hawthorne MD    Anesthesia Type: spinal ASA Status: 3          Anesthesia Type: spinal    Vitals  Vitals Value Taken Time   /61 10/22/20 0930   Temp 97.1 °F (36.2 °C) 10/22/20 0930   Pulse 72 10/22/20 0941   Resp 20 10/22/20 0930   SpO2 97 % 10/22/20 0941   Vitals shown include unvalidated device data.        Post Anesthesia Care and Evaluation    Patient location during evaluation: PACU  Patient participation: complete - patient participated  Level of consciousness: awake and alert  Pain score: 0  Pain management: adequate  Airway patency: patent  Anesthetic complications: No anesthetic complications  PONV Status: none  Cardiovascular status: stable  Respiratory status: acceptable, nasal cannula and spontaneous ventilation  Hydration status: stable    Comments: Pt transferred to PACU with O2. Vital signs stable. Report to PACU RN and care accepted.

## 2020-10-22 NOTE — BRIEF OP NOTE
TOTAL KNEE ARTHROPLASTY  Progress Note    Flory Haas  10/22/2020    Pre-op Diagnosis:   left knee osteoarthritis       Post-Op Diagnosis Codes:     * Primary osteoarthritis of left knee [M17.12]    Procedure/CPT® Codes:  CT TOTAL KNEE ARTHROPLASTY [66664]      Procedure(s):  LEFT TOTAL KNEE ARTHROPLASTY    Surgeon(s):  Fernandez Enriquez MD     Assistant: MARCELA Torres    Anesthesia: Spinal, local and adductor canal catheter    Staff:   Circulator: Jose Gill RN  Scrub Person: Gabbie Juan  Vendor Representative: Hira Woody  Nursing Assistant: Mouniak Schwab, PCT; Alvaro Astorga  Assistant: Jewel Iniguez RNFA  Assistant: Jewel Iniguez RNFA      Estimated Blood Loss: minimal    Urine Voided: * No values recorded between 10/22/2020  7:24 AM and 10/22/2020  9:03 AM *    Specimens:                None          Drains: * No LDAs found *    Findings: Left knee severe tricompartmental degenerative changes    Complications: None    Implants: Smith [employer] Nephew posterior stabilized total knee arthroplasty with a size 4 femur, 3 tibia, 9 polyethylene and 29 thin patella    Tourniquet time: 66 minutes at 300 mmHg    Assistant: Jewel Iniguez RNFA  was responsible for performing the following activities: Retraction, implantation of components and closure and their skilled assistance was necessary for the success of this case.    Fernandez Enriquez MD     Date: 10/22/2020  Time: 09:13 EDT

## 2020-10-22 NOTE — H&P
Patient Name: Flory Haas  MRN: [id number]  : 1950  DOS: 10/22/2020    Attending: Fernandez Enriquez,*    Primary Care Provider: Aracely Schneider MD      Chief complaint: Left knee pain    Subjective   Patient is a pleasant 70 y.o. female presented for scheduled surgery by Dr. Enriquez.  She underwent left total knee arthroplasty under spinal anesthesia.  She tolerated surgery well was admitted for further medical management.  Her knee has been painful for many years.  She denies use of assistive device for ambulation.  She does report recent falls.    When seen postop she is doing okay.  She is having moderate lateral knee pain after ambulating with PT, getting better after pain medications.  She denies nausea, shortness of breath or chest pain.  No history of DVT or PE.    (Above is noted, agree.  Complains of some postoperative pain.  Was seen by PT earlier was able to ambulate 5 feet using rolling walker and contact-guard assist of 2 limited by complains of extreme right knee pain.  Pain is starting to improve since.  No nausea, vomiting, or shortness of breath.  Resting in her recliner.)wy    Allergies:  Allergies   Allergen Reactions   • Latex Other (See Comments)     Breaks out in blisters       Meds:  Medications Prior to Admission   Medication Sig Dispense Refill Last Dose   • Calcium Carbonate-Vitamin D (CALTRATE 600+D PO) Take 600 mg by mouth Daily.   10/21/2020 at Unknown time   • DULoxetine (CYMBALTA) 60 MG capsule Take 60 mg by mouth Daily.   10/21/2020 at Unknown time   • fenofibrate 160 MG tablet Take 160 mg by mouth Daily.   10/21/2020 at Unknown time   • irbesartan-hydrochlorothiazide (AVALIDE) 300-12.5 MG tablet Take 1 tablet by mouth Daily.   10/21/2020 at Unknown time   • Multiple Vitamins-Minerals (MULTIVITAMIN ADULT PO) Take 1 tablet by mouth Daily.   10/21/2020 at Unknown time   • Omega-3 Fatty Acids (fish oil) 1000 MG capsule capsule Take 1,000 mg by mouth Daily With Breakfast.    10/21/2020 at Unknown time   • omeprazole (priLOSEC) 40 MG capsule Take 40 mg by mouth Daily.   10/21/2020 at Unknown time   • oxyCODONE-acetaminophen (PERCOCET) 7.5-325 MG per tablet Take 1 tablet by mouth Every 6 (Six) Hours As Needed for Moderate Pain .   10/21/2020 at Unknown time   • potassium chloride (K-DUR,KLOR-CON) 20 MEQ CR tablet Take 20 mEq by mouth Daily.   10/21/2020 at Unknown time   • pregabalin (Lyrica) 150 MG capsule Take 150 mg by mouth 2 (Two) Times a Day.   10/21/2020 at Unknown time   • tiZANidine (ZANAFLEX) 4 MG tablet Take 4 mg by mouth At Night As Needed for Muscle Spasms.   10/21/2020 at Unknown time   • valACYclovir (VALTREX) 500 MG tablet Take 500 mg by mouth 2 (Two) Times a Day.   10/21/2020 at Unknown time   • celecoxib (CeleBREX) 200 MG capsule Take 200 mg by mouth 2 (Two) Times a Day.   10/17/2020   • vitamin E 1000 UNIT capsule Take 1,000 Units by mouth Daily.   10/17/2020     History:   Past Medical History:   Diagnosis Date   • Arthritis    • GERD (gastroesophageal reflux disease)    • Hyperlipidemia    • Hypertension    • Migraines    • Syncope     about a year ago patient fainted upon standing too quickly. MD informed and did not find cause     Past Surgical History:   Procedure Laterality Date   • BREAST CYST ASPIRATION Right 1990's ??   • COLONOSCOPY      patient reports several years ago she had a colonscopy   • HYSTERECTOMY     • OOPHORECTOMY       Family History   Problem Relation Age of Onset   • Breast cancer Sister 78   • Ovarian cancer Neg Hx      Social History     Tobacco Use   • Smoking status: Never Smoker   • Smokeless tobacco: Never Used   Substance Use Topics   • Alcohol use: Yes     Comment: social   • Drug use: Never   She lives with her fiancé and has 2 children.  She is retired  for [employer].    Review of Systems  Pertinent items are noted in HPI, all other systems reviewed and negative    Vital Signs  /81 (BP Location: Right arm,  "Patient Position: Lying)   Pulse 96   Temp 98.1 °F (36.7 °C) (Oral)   Resp 16   Ht 165.1 cm (65\")   Wt 99.8 kg (220 lb)   SpO2 90%   BMI 36.61 kg/m²     Physical Exam:    General Appearance:    Alert, cooperative, in no acute distress   Head:    Normocephalic, without obvious abnormality, atraumatic   Eyes:            Lids and lashes normal, conjunctivae and sclerae normal, no   icterus, no pallor, corneas clear,    Ears:    Ears appear intact with no abnormalities noted   Throat:   No oral lesions, no thrush, oral mucosa moist   Neck:   No adenopathy, supple, trachea midline, no thyromegaly    Lungs:     Clear to auscultation,respirations regular, even and unlabored    Heart:    Regular rhythm and normal rate, normal S1 and S2, no murmur, no gallop   Abdomen:     Normal bowel sounds, no masses, no organomegaly, soft non-tender, non-distended, no guarding, no rebound  tenderness   Genitalia:    Deferred   Extremities:  Left knee Ace wrap clean dry intact.  Nerve block present.   Pulses:   Pulses palpable and equal bilaterally   Skin:   No bleeding, bruising or rash   Neurologic:   Cranial nerves 2 - 12 grossly intact. Flexion and dorsiflexion intact bilateral feet.        I reviewed the patient's new clinical results.         Results from last 7 days   Lab Units 10/22/20  1156 10/22/20  0550   POTASSIUM mmol/L 4.7 4.1     Lab Results   Component Value Date    HGBA1C 5.40 10/14/2020     Results for RACHEL SHIRLEY (MRN [id number]) as of 10/22/2020 12:07   Ref. Range 10/14/2020 10:19   Glucose Latest Ref Range: 65 - 99 mg/dL 69   Sodium Latest Ref Range: 136 - 145 mmol/L 140   Potassium Latest Ref Range: 3.5 - 5.2 mmol/L 4.5   CO2 Latest Ref Range: 22.0 - 29.0 mmol/L 30.0 (H)   Chloride Latest Ref Range: 98 - 107 mmol/L 101   Anion Gap Latest Ref Range: 5.0 - 15.0 mmol/L 9.0   Creatinine Latest Ref Range: 0.57 - 1.00 mg/dL 0.79   BUN Latest Ref Range: 8 - 23 mg/dL 15   BUN/Creatinine Ratio Latest Ref Range: 7.0 - " 25.0  19.0   Calcium Latest Ref Range: 8.6 - 10.5 mg/dL 10.3   eGFR Non  Am Latest Ref Range: >60 mL/min/1.73 72   Hemoglobin A1C Latest Ref Range: 4.80 - 5.60 % 5.40   WBC Latest Ref Range: 3.40 - 10.80 10*3/mm3 6.38   RBC Latest Ref Range: 3.77 - 5.28 10*6/mm3 3.80   Hemoglobin Latest Ref Range: 12.0 - 15.9 g/dL 13.7   Hematocrit Latest Ref Range: 34.0 - 46.6 % 41.0   RDW Latest Ref Range: 12.3 - 15.4 % 12.7   MCV Latest Ref Range: 79.0 - 97.0 fL 107.9 (H)   MCH Latest Ref Range: 26.6 - 33.0 pg 36.1 (H)   MCHC Latest Ref Range: 31.5 - 35.7 g/dL 33.4   MPV Latest Ref Range: 6.0 - 12.0 fL 9.9   Platelets Latest Ref Range: 140 - 450 10*3/mm3 228       Assessment and Plan:     Status post total left knee replacement    Primary osteoarthritis of left knee    Hypertension    Hyperlipidemia    GERD (gastroesophageal reflux disease)      Plan  1. PT/OT- WBAT LLE  2. Pain control-prns, AC nerve block   3. IS-encourage  4. DVT proph- Mechs/ASA  5. Bowel regimen  6. Resume home medications as appropriate  7. Monitor post-op labs  8. DC planning for home    HTN, Hyperlipidemia  - Continue home cozaar  - Hold HCTZ  - Monitor BP   - Holding parameters for BP meds  - Labetalol PRN for SBP>170    GERD  - Continue home PPI      MAGGI Sharma  10/22/20  14:07 EDT     I have personally performed the evaluation on this patient. My history is consistent  with HPI obtained. My exam findings are listed above. I have personally reviewed and discussed the above formulated treatment plan with pt and AH. [email or web address].

## 2020-10-22 NOTE — ANESTHESIA PREPROCEDURE EVALUATION
Anesthesia Evaluation                  Airway   Mallampati: I  TM distance: >3 FB  Neck ROM: full  No difficulty expected  Dental      Pulmonary    Cardiovascular     ECG reviewed    (+) hypertension,       Neuro/Psych  (+) headaches, syncope,     GI/Hepatic/Renal/Endo    (+)  GERD,      Musculoskeletal     Abdominal    Substance History      OB/GYN          Other                        Anesthesia Plan    ASA 3     spinal   (Acb)  intravenous induction     Anesthetic plan, all risks, benefits, and alternatives have been provided, discussed and informed consent has been obtained with: patient.    Plan discussed with CRNA.

## 2020-10-22 NOTE — PLAN OF CARE
Problem: Adult Inpatient Plan of Care  Goal: Plan of Care Review  Flowsheets (Taken 10/22/2020 1310)  Progress: improving  Plan of Care Reviewed With:   patient   spouse  Outcome Summary: PT eval complete. Pt ambulated 5 feet using RW and CGA x2. Gait limited by extreme right knee pain. Bed mobility requiring min A and STS requiring CGA x2. No knee buckling noted with ambulation. Pt unable to perform SLR. Will assess L knee AROM POD#1. Reviewed HEP and knee precautions. PADD score = 7. Recommend pt d/c home with assist and HHPT when appropriate.

## 2020-10-22 NOTE — INTERVAL H&P NOTE
"Pre-Op H&P (See Recent Office Note Attached for Full H&P)    Chief complaint: left knee pain/swelling    HPI:      Patient is a 70 y.o. female who presents with a 1 year history of pain and swelling in the left knee joint. The pain is worse with mobility - walking, twisting. She has had previous right knee replacement in 2013 and left knee scope in 2016. She has failed to adequately respond to conservative treatment (cortisone injections) and presents to the operating room today for surgical management.     Review of Systems:  General ROS:  no fever, chills, rashes.  No change since last office visit.  No recent sick exposure  Cardiovascular ROS: no chest pain or dyspnea on exertion  Respiratory ROS: no cough, shortness of breath, or wheezing    Immunization History:   Influenza:  2020  Pneumococcal:  Patient unsure of date  Tetanus:  >10 years      Vital Signs:  /73 (BP Location: Right arm, Patient Position: Lying)   Pulse 98   Temp 96.4 °F (35.8 °C) (Temporal)   Resp 20   Ht 165.1 cm (65\")   Wt 99.8 kg (220 lb)   SpO2 95%   BMI 36.61 kg/m²     Physical Exam:    CV:  S1S2 regular rate and rhythm, no murmur               Resp:  Clear to auscultation; respirations regular, even and unlabored    Results Review:     Lab Results   Component Value Date    WBC 6.38 10/14/2020    HGB 13.7 10/14/2020    HCT 41.0 10/14/2020    .9 (H) 10/14/2020     10/14/2020    NEUTROABS 4.80 01/10/2014    GLUCOSE 69 10/14/2020    BUN 15 10/14/2020    CREATININE 0.79 10/14/2020    EGFRIFNONA 72 10/14/2020     10/14/2020    K 4.5 10/14/2020     10/14/2020    CO2 30.0 (H) 10/14/2020    CALCIUM 10.3 10/14/2020    ALBUMIN 4.2 01/10/2014    ALBUMIN 4.2 01/10/2014    AST 28 01/10/2014    ALT 26 01/10/2014    BILITOT 0.9 01/10/2014         Cancer Staging (if applicable)  Cancer Patient: __ yes _x_no __unknown; If yes, clinical stage T:__ N:__M:__, stage group or __N/A    Assessment: primary osteoarthritis of " left knee    Plan: left total knee arthroplasty       Jordi Santiago PA-C  10/22/2020   06:52 EDT

## 2020-10-22 NOTE — THERAPY EVALUATION
Patient Name: Flory Haas  : 1950    MRN: [id number]                              Today's Date: 10/22/2020       Admit Date: 10/22/2020    Visit Dx:     ICD-10-CM ICD-9-CM   1. Status post total left knee replacement  Z96.652 V43.65     Patient Active Problem List   Diagnosis   • Primary osteoarthritis of left knee   • Status post total left knee replacement   • Hypertension   • Hyperlipidemia   • GERD (gastroesophageal reflux disease)     Past Medical History:   Diagnosis Date   • Arthritis    • GERD (gastroesophageal reflux disease)    • Hyperlipidemia    • Hypertension    • Migraines    • Syncope     about a year ago patient fainted upon standing too quickly. MD informed and did not find cause     Past Surgical History:   Procedure Laterality Date   • BREAST CYST ASPIRATION Right  ??   • COLONOSCOPY      patient reports several years ago she had a colonscopy   • HYSTERECTOMY     • OOPHORECTOMY       General Information     Row Name 10/22/20 1310          Physical Therapy Time and Intention    Document Type  evaluation  -ELISA     Mode of Treatment  individual therapy;physical therapy  -ELISA     Row Name 10/22/20 1310          General Information    Patient Profile Reviewed  yes  -ELISA     Prior Level of Function  min assist:;bed mobility;ADL's;all household mobility;transfer  -ELISA     Existing Precautions/Restrictions  fall;other (see comments) L adductor nerve catheter  -ELISA     Barriers to Rehab  none identified  -ELISA     Row Name 10/22/20 1310          Living Environment    Lives With  significant other  -ELISA     Row Name 10/22/20 1310          Home Main Entrance    Number of Stairs, Main Entrance  none  -ELISA     Row Name 10/22/20 1310          Stairs Within Home, Primary    Stairs, Within Home, Primary  0  -ELISA     Number of Stairs, Within Home, Primary  none  -ELISA     Row Name 10/22/20 1310          Cognition    Orientation Status (Cognition)  oriented x 4  -ELISA     Row Name 10/22/20 1310          Safety  Issues, Functional Mobility    Safety Issues Affecting Function (Mobility)  safety precautions follow-through/compliance;safety precaution awareness;sequencing abilities  -ELISA     Impairments Affecting Function (Mobility)  endurance/activity tolerance;range of motion (ROM);strength;pain  -     Comment, Safety Issues/Impairments (Mobility)  Pt in a lot of pain limiting mobility  -       User Key  (r) = Recorded By, (t) = Taken By, (c) = Cosigned By    Initials Name Provider Type    ELISA mEre Rea, PT Physical Therapist        Mobility     Row Name 10/22/20 1310          Bed Mobility    Bed Mobility  scooting/bridging;supine-sit  -ELISA     Scooting/Bridging [place] (Bed Mobility)  verbal cues;minimum assist (75% patient effort)  -     Supine-Sit [place] (Bed Mobility)  verbal cues;minimum assist (75% patient effort)  -     Assistive Device (Bed Mobility)  bed rails;head of bed elevated  -     Comment (Bed Mobility)  Min A for LE management off of EOB and cues for use of UEs to push trunk into sitting  -     Row Name 10/22/20 1310          Transfers    Comment (Transfers)  Verbal cues for safe hand placement during standing/sitting and moving L LE out for comfort prior to sitting  -     Row Name 10/22/20 1310          Sit-Stand Transfer    Sit-Stand [place] (Transfers)  verbal cues;contact guard;2 person assist  -ELISA     Assistive Device (Sit-Stand Transfers)  walker, front-wheeled  -     Row Name 10/22/20 1310          Gait/Stairs (Locomotion)    [place] Level (Gait)  verbal cues;contact guard;2 person assist  -ELISA     Assistive Device (Gait)  walker, front-wheeled  -     Distance in Feet (Gait)  5 feet  -     Deviations/Abnormal Patterns (Gait)  bilateral deviations;edgar decreased;stride length decreased;gait speed decreased;antalgic  -ELISA     Bilateral Gait Deviations  forward flexed posture  -ELISA     Left Sided Gait Deviations  heel strike decreased;weight shift ability decreased   -     [place] Level (Stairs)  not tested  -     Comment (Gait/Stairs)  Pt ambulated with step to pattern and decreased speed. Verbal cues for maintaining upright posture, body within walker, increase step length, and WB on L LE. No knee buckling noted with gait. Gait limited by pain.  -     Row Name 10/22/20 1310          Mobility    Extremity Weight-bearing Status  left lower extremity  -     Left Lower Extremity (Weight-bearing Status)  weight-bearing as tolerated (WBAT)  -       User Key  (r) = Recorded By, (t) = Taken By, (c) = Cosigned By    Initials Name Provider Type    ELISA Emre Rea, PT Physical Therapist        Obj/Interventions     Row Name 10/22/20 1310          Range of Motion Comprehensive    General Range of Motion  lower extremity range of motion deficits identified  -     Comment, General Range of Motion  R LE AROM WFL; L LE AROM impaired 25%; able to actively DF/PF  -     Row Name 10/22/20 1310          Strength Comprehensive (MMT)    General Manual Muscle Testing (MMT) Assessment  lower extremity strength deficits identified  -     Comment, General Manual Muscle Testing (MMT) Assessment  R LE functionally 4+/5; L LE functionally 4-/5; unable to perform SLR  -     Row Name 10/22/20 1310          Motor Skills    Therapeutic Exercise  hip;knee;ankle  -[facility] Name 10/22/20 1310          Hip (Therapeutic Exercise)    Hip (Therapeutic Exercise)  isometric exercises  -     Hip Isometrics (Therapeutic Exercise)  gluteal sets;10 repetitions  -     Row Name 10/22/20 1310          Knee (Therapeutic Exercise)    Knee (Therapeutic Exercise)  isometric exercises  -     Knee Isometrics (Therapeutic Exercise)  quad sets;10 repetitions  -[facility] Name 10/22/20 1310          Ankle (Therapeutic Exercise)    Ankle (Therapeutic Exercise)  AROM (active range of motion)  -     Ankle AROM (Therapeutic Exercise)  bilateral;dorsiflexion;plantarflexion;10 repetitions  -[facility] Name  10/22/20 1310          Sensory Assessment (Somatosensory)    Sensory Assessment (Somatosensory)  LE sensation intact  -ELISA       User Key  (r) = Recorded By, (t) = Taken By, (c) = Cosigned By    Initials Name Provider Type    Emre Leigh, PT Physical Therapist        Goals/Plan     Row Name 10/22/20 1310          Bed Mobility Goal 1 (PT)    Activity/Assistive Device (Bed Mobility Goal 1, PT)  sit to supine/supine to sit  -ELISA     [place] Level/Cues Needed (Bed Mobility Goal 1, PT)  modified independence  -ELISA     Time Frame (Bed Mobility Goal 1, PT)  long term goal (LTG);3 days  -ELISA     Row Name 10/22/20 1310          Transfer Goal 1 (PT)    Activity/Assistive Device (Transfer Goal 1, PT)  sit-to-stand/stand-to-sit;walker, rolling  -ELISA     [place] Level/Cues Needed (Transfer Goal 1, PT)  modified independence  -ELISA     Time Frame (Transfer Goal 1, PT)  long term goal (LTG);3 days  -ELISA     Row Name 10/22/20 1310          Gait Training Goal 1 (PT)    Activity/Assistive Device (Gait Training Goal 1, PT)  gait (walking locomotion);walker, rolling  -ELISA     [place] Level (Gait Training Goal 1, PT)  modified independence  -ELISA     Distance (Gait Training Goal 1, PT)  150 feet  -ELISA     Time Frame (Gait Training Goal 1, PT)  long term goal (LTG);3 days  -ELISA     Row Name 10/22/20 1310          ROM Goal 1 (PT)    ROM Goal 1 (PT)  L knee AROM 0-90 degrees  -ELISA     Time Frame (ROM Goal 1, PT)  long-term goal (LTG);3 days  -ELISA       User Key  (r) = Recorded By, (t) = Taken By, (c) = Cosigned By    Initials Name Provider Type    Emre Leigh PT Physical Therapist        Clinical Impression     Row Name 10/22/20 1310          Pain    Additional Documentation  Pain Scale: Numbers Pre/Post-Treatment (Group)  -     Row Name 10/22/20 1310          Pain Scale: Numbers Pre/Post-Treatment    Pretreatment Pain Rating  9/10  -ELSIA     Posttreatment Pain Rating  9/10  -ELISA     Pain Location - Side  Left  -ELISA     Pain Location -  Orientation  anterior  -ELISA     Pain Location  knee  -ELISA     Pain Intervention(s)  Ambulation/increased activity;Cold applied;Repositioned  -ELISA     Row Name 10/22/20 1310          Therapy Assessment/Plan (PT)    Patient/Family Therapy Goals Statement (PT)  To return home  -ELISA     Rehab Potential (PT)  good, to achieve stated therapy goals  -ELISA     Criteria for Skilled Interventions Met (PT)  yes;meets criteria;skilled treatment is necessary  -ELISA     Row Name 10/22/20 1310          Positioning and Restraints    Pre-Treatment Position  in bed  -ELISA     Post Treatment Position  chair  -ELISA     In Chair  notified nsg;reclined;call light within reach;encouraged to call for assist;exit alarm on;with family/caregiver;compression device;legs elevated  -ELISA       User Key  (r) = Recorded By, (t) = Taken By, (c) = Cosigned By    Initials Name Provider Type    Emre Leigh, PT Physical Therapist        Outcome Measures     Row Name 10/22/20 1310          How much help from another person do you currently need...    Turning from your back to your side while in flat bed without using bedrails?  2  -ELISA     Moving from lying on back to sitting on the side of a flat bed without bedrails?  2  -ELISA     Moving to and from a bed to a chair (including a wheelchair)?  3  -ELISA     Standing up from a chair using your arms (e.g., wheelchair, bedside chair)?  3  -ELISA     Climbing 3-5 steps with a railing?  2  -ELISA     To walk in hospital room?  3  -ELISA     AM-PAC 6 Clicks Score (PT)  15  -ELISA     Row Name 10/22/20 1310          PADD    Diagnosis  1  -ELISA     Gender  1  -ELISA     Age Group  1  -ELISA     Gait Distance  0  -ELISA     Assist Level  1  -ELISA     Home Support  3  -ELISA     PADD Score  7  -ELISA     Patient Preference  home with home health  -ELISA     Prediction by PADD Score  extended rehabilitation  -ELISA     Row Name 10/22/20 1310          Functional Assessment    Outcome Measure Options  PADD;AM-PAC 6 Clicks Basic Mobility (PT)  -ELISA       User Key  (r) =  Recorded By, (t) = Taken By, (c) = Cosigned By    Initials Name Provider Type    Emre Leigh, PT Physical Therapist        Physical Therapy Education                 Title: PT OT SLP Therapies (Done)     Topic: Physical Therapy (Done)     Point: Mobility training (Done)     Learning Progress Summary           Patient Acceptance, E,D, VU by ELISA at 10/22/2020 1310    Comment: Educated on safe sequencing with bed mobility, ambulatory transfers, and gait. Reviewed HEP and knee precautions.   Significant Other Acceptance, E,D, VU by ELISA at 10/22/2020 1310    Comment: Educated on safe sequencing with bed mobility, ambulatory transfers, and gait. Reviewed HEP and knee precautions.                   Point: Home exercise program (Done)     Learning Progress Summary           Patient Acceptance, E,D, VU by ELISA at 10/22/2020 1310    Comment: Educated on safe sequencing with bed mobility, ambulatory transfers, and gait. Reviewed HEP and knee precautions.   Significant Other Acceptance, E,D, VU by ELISA at 10/22/2020 1310    Comment: Educated on safe sequencing with bed mobility, ambulatory transfers, and gait. Reviewed HEP and knee precautions.                   Point: Body mechanics (Done)     Learning Progress Summary           Patient Acceptance, E,D, VU by EILSA at 10/22/2020 1310    Comment: Educated on safe sequencing with bed mobility, ambulatory transfers, and gait. Reviewed HEP and knee precautions.   Significant Other Acceptance, E,D, VU by ELISA at 10/22/2020 1310    Comment: Educated on safe sequencing with bed mobility, ambulatory transfers, and gait. Reviewed HEP and knee precautions.                   Point: Precautions (Done)     Learning Progress Summary           Patient Acceptance, E,D, VU by ELISA at 10/22/2020 1310    Comment: Educated on safe sequencing with bed mobility, ambulatory transfers, and gait. Reviewed HEP and knee precautions.   Significant Other Acceptance, E,D, VU by ELISA at 10/22/2020 1310    Comment:  Educated on safe sequencing with bed mobility, ambulatory transfers, and gait. Reviewed HEP and knee precautions.                               User Key     Initials Effective Dates Name Provider Type Discipline     09/10/19 -  Emre Rea, PT Physical Therapist PT              PT Recommendation and Plan  Planned Therapy Interventions (PT): balance training, bed mobility training, gait training, home exercise program, patient/family education, strengthening, stair training, ROM (range of motion), transfer training  Plan of Care Reviewed With: patient, spouse  Progress: improving  Outcome Summary: PT eval complete. Pt ambulated 5 feet using RW and CGA x2. Gait limited by extreme right knee pain. Bed mobility requiring min A and STS requiring CGA x2. No knee buckling noted with ambulation. Pt unable to perform SLR. Will assess L knee AROM POD#1. Reviewed HEP and knee precautions. PADD score = 7. Recommend pt d/c home with assist and HHPT when appropriate.     Time Calculation:   PT Charges     Row Name 10/22/20 1310             Time Calculation    Start Time  1310  -ELISA      PT Received On  10/22/20  -      PT Goal Re-Cert Due Date  11/01/20  -         Time Calculation- PT    Total Timed Code Minutes- PT  10 minute(s)  -         Timed Charges    45372 - PT Therapeutic Exercise Minutes  2  -ELISA      35808 - Gait Training Minutes   8  -ELISA        User Key  (r) = Recorded By, (t) = Taken By, (c) = Cosigned By    Initials Name Provider Type    ELISA Emre Rea, PT Physical Therapist        Therapy Charges for Today     Code Description Service Date Service Provider Modifiers Qty    09361436510 HC GAIT TRAINING EA 15 MIN 10/22/2020 Emre Rea, PT GP 1    41998253167 HC PT EVAL LOW COMPLEXITY 3 10/22/2020 Emre Rea, PT GP 1    [id number]  PT THER SUPP EA 15 MIN 10/22/2020 Emre Rea, PT GP 2          PT G-Codes  Outcome Measure Options: PADD, AM-PAC 6 Clicks Basic Mobility (PT)  AM-PAC 6 Clicks Score (PT): 15    Emre  Álvaro, PT  10/22/2020

## 2020-10-23 VITALS
HEIGHT: 65 IN | OXYGEN SATURATION: 94 % | TEMPERATURE: 98.5 F | BODY MASS INDEX: 36.65 KG/M2 | SYSTOLIC BLOOD PRESSURE: 150 MMHG | RESPIRATION RATE: 16 BRPM | WEIGHT: 220 LBS | HEART RATE: 85 BPM | DIASTOLIC BLOOD PRESSURE: 72 MMHG

## 2020-10-23 PROBLEM — D62 ACUTE BLOOD LOSS ANEMIA: Status: ACTIVE | Noted: 2020-10-23

## 2020-10-23 PROBLEM — G89.18 ACUTE POSTOPERATIVE PAIN: Status: ACTIVE | Noted: 2020-10-23

## 2020-10-23 PROBLEM — D72.829 LEUKOCYTOSIS: Status: ACTIVE | Noted: 2020-10-23

## 2020-10-23 LAB
ANION GAP SERPL CALCULATED.3IONS-SCNC: 10 MMOL/L (ref 5–15)
BASOPHILS # BLD AUTO: 0.02 10*3/MM3 (ref 0–0.2)
BASOPHILS NFR BLD AUTO: 0.2 % (ref 0–1.5)
BUN SERPL-MCNC: 23 MG/DL (ref 8–23)
BUN/CREAT SERPL: 25.8 (ref 7–25)
CALCIUM SPEC-SCNC: 9.1 MG/DL (ref 8.6–10.5)
CHLORIDE SERPL-SCNC: 99 MMOL/L (ref 98–107)
CO2 SERPL-SCNC: 27 MMOL/L (ref 22–29)
CREAT SERPL-MCNC: 0.89 MG/DL (ref 0.57–1)
DEPRECATED RDW RBC AUTO: 50.4 FL (ref 37–54)
EOSINOPHIL # BLD AUTO: 0.03 10*3/MM3 (ref 0–0.4)
EOSINOPHIL NFR BLD AUTO: 0.3 % (ref 0.3–6.2)
ERYTHROCYTE [DISTWIDTH] IN BLOOD BY AUTOMATED COUNT: 12.5 % (ref 12.3–15.4)
GFR SERPL CREATININE-BSD FRML MDRD: 63 ML/MIN/1.73
GLUCOSE SERPL-MCNC: 95 MG/DL (ref 65–99)
HCT VFR BLD AUTO: 34.6 % (ref 34–46.6)
HGB BLD-MCNC: 11.2 G/DL (ref 12–15.9)
IMM GRANULOCYTES # BLD AUTO: 0.06 10*3/MM3 (ref 0–0.05)
IMM GRANULOCYTES NFR BLD AUTO: 0.5 % (ref 0–0.5)
LYMPHOCYTES # BLD AUTO: 2.68 10*3/MM3 (ref 0.7–3.1)
LYMPHOCYTES NFR BLD AUTO: 23.2 % (ref 19.6–45.3)
MCH RBC QN AUTO: 35.3 PG (ref 26.6–33)
MCHC RBC AUTO-ENTMCNC: 32.4 G/DL (ref 31.5–35.7)
MCV RBC AUTO: 109.1 FL (ref 79–97)
MONOCYTES # BLD AUTO: 1.16 10*3/MM3 (ref 0.1–0.9)
MONOCYTES NFR BLD AUTO: 10.1 % (ref 5–12)
NEUTROPHILS NFR BLD AUTO: 65.7 % (ref 42.7–76)
NEUTROPHILS NFR BLD AUTO: 7.58 10*3/MM3 (ref 1.7–7)
NRBC BLD AUTO-RTO: 0 /100 WBC (ref 0–0.2)
PLATELET # BLD AUTO: 208 10*3/MM3 (ref 140–450)
PMV BLD AUTO: 10 FL (ref 6–12)
POTASSIUM SERPL-SCNC: 3.7 MMOL/L (ref 3.5–5.2)
RBC # BLD AUTO: 3.17 10*6/MM3 (ref 3.77–5.28)
SODIUM SERPL-SCNC: 136 MMOL/L (ref 136–145)
WBC # BLD AUTO: 11.53 10*3/MM3 (ref 3.4–10.8)

## 2020-10-23 PROCEDURE — 63710000001 OXYCODONE 5 MG TABLET: Performed by: ORTHOPAEDIC SURGERY

## 2020-10-23 PROCEDURE — 63710000001 PANTOPRAZOLE 40 MG TABLET DELAYED-RELEASE: Performed by: NURSE PRACTITIONER

## 2020-10-23 PROCEDURE — 97110 THERAPEUTIC EXERCISES: CPT | Performed by: GENERAL ACUTE CARE HOSPITAL

## 2020-10-23 PROCEDURE — 97166 OT EVAL MOD COMPLEX 45 MIN: CPT

## 2020-10-23 PROCEDURE — A9270 NON-COVERED ITEM OR SERVICE: HCPCS | Performed by: NURSE PRACTITIONER

## 2020-10-23 PROCEDURE — 63710000001 PREGABALIN 150 MG CAPSULE: Performed by: INTERNAL MEDICINE

## 2020-10-23 PROCEDURE — 63710000001 ACETAMINOPHEN 500 MG TABLET: Performed by: ORTHOPAEDIC SURGERY

## 2020-10-23 PROCEDURE — A9270 NON-COVERED ITEM OR SERVICE: HCPCS | Performed by: ORTHOPAEDIC SURGERY

## 2020-10-23 PROCEDURE — 97116 GAIT TRAINING THERAPY: CPT | Performed by: GENERAL ACUTE CARE HOSPITAL

## 2020-10-23 PROCEDURE — 80048 BASIC METABOLIC PNL TOTAL CA: CPT | Performed by: ORTHOPAEDIC SURGERY

## 2020-10-23 PROCEDURE — 63710000001 ASPIRIN EC 325 MG TABLET DELAYED-RELEASE: Performed by: ORTHOPAEDIC SURGERY

## 2020-10-23 PROCEDURE — A9270 NON-COVERED ITEM OR SERVICE: HCPCS | Performed by: INTERNAL MEDICINE

## 2020-10-23 PROCEDURE — 85025 COMPLETE CBC W/AUTO DIFF WBC: CPT | Performed by: ORTHOPAEDIC SURGERY

## 2020-10-23 PROCEDURE — 25010000003 CEFAZOLIN IN DEXTROSE 2-4 GM/100ML-% SOLUTION: Performed by: ORTHOPAEDIC SURGERY

## 2020-10-23 PROCEDURE — 97535 SELF CARE MNGMENT TRAINING: CPT

## 2020-10-23 PROCEDURE — 25010000002 KETOROLAC TROMETHAMINE PER 15 MG: Performed by: ORTHOPAEDIC SURGERY

## 2020-10-23 PROCEDURE — 63710000001 DULOXETINE 60 MG CAPSULE DELAYED-RELEASE PARTICLES: Performed by: NURSE PRACTITIONER

## 2020-10-23 PROCEDURE — 63710000001 MELOXICAM 7.5 MG TABLET: Performed by: ORTHOPAEDIC SURGERY

## 2020-10-23 RX ORDER — DOCUSATE SODIUM 100 MG/1
100 CAPSULE, LIQUID FILLED ORAL 2 TIMES DAILY
Qty: 60 CAPSULE | Refills: 0 | Status: SHIPPED | OUTPATIENT
Start: 2020-10-23

## 2020-10-23 RX ORDER — CELECOXIB 200 MG/1
200 CAPSULE ORAL 2 TIMES DAILY
Start: 2020-10-23

## 2020-10-23 RX ADMIN — MELOXICAM 15 MG: 7.5 TABLET ORAL at 08:57

## 2020-10-23 RX ADMIN — OXYCODONE 10 MG: 5 TABLET ORAL at 08:56

## 2020-10-23 RX ADMIN — KETOROLAC TROMETHAMINE 15 MG: 15 INJECTION, SOLUTION INTRAMUSCULAR; INTRAVENOUS at 10:30

## 2020-10-23 RX ADMIN — DULOXETINE HYDROCHLORIDE 60 MG: 60 CAPSULE, DELAYED RELEASE ORAL at 08:57

## 2020-10-23 RX ADMIN — ACETAMINOPHEN 1000 MG: 500 TABLET, FILM COATED ORAL at 08:57

## 2020-10-23 RX ADMIN — PANTOPRAZOLE SODIUM 40 MG: 40 TABLET, DELAYED RELEASE ORAL at 08:56

## 2020-10-23 RX ADMIN — OXYCODONE 10 MG: 5 TABLET ORAL at 04:12

## 2020-10-23 RX ADMIN — CEFAZOLIN SODIUM 2 G: 2 INJECTION, SOLUTION INTRAVENOUS at 01:24

## 2020-10-23 RX ADMIN — ACETAMINOPHEN 1000 MG: 500 TABLET, FILM COATED ORAL at 17:07

## 2020-10-23 RX ADMIN — PREGABALIN 150 MG: 150 CAPSULE ORAL at 08:57

## 2020-10-23 RX ADMIN — ASPIRIN 325 MG: 325 TABLET, COATED ORAL at 08:57

## 2020-10-23 RX ADMIN — OXYCODONE 10 MG: 5 TABLET ORAL at 17:07

## 2020-10-23 NOTE — THERAPY TREATMENT NOTE
Patient Name: Flory Haas  : 1950    MRN: [id number]                              Today's Date: 10/23/2020       Admit Date: 10/22/2020    Visit Dx:     ICD-10-CM ICD-9-CM   1. Status post total left knee replacement  Z96.652 V43.65     Patient Active Problem List   Diagnosis   • Primary osteoarthritis of left knee   • Status post total left knee replacement   • Hypertension   • Hyperlipidemia   • GERD (gastroesophageal reflux disease)     Past Medical History:   Diagnosis Date   • Arthritis    • GERD (gastroesophageal reflux disease)    • Hyperlipidemia    • Hypertension    • Migraines    • Syncope     about a year ago patient fainted upon standing too quickly. MD informed and did not find cause     Past Surgical History:   Procedure Laterality Date   • BREAST CYST ASPIRATION Right  ??   • COLONOSCOPY      patient reports several years ago she had a colonscopy   • HYSTERECTOMY     • OOPHORECTOMY     • TOTAL KNEE ARTHROPLASTY Left 10/22/2020    Procedure: TOTAL KNEE ARTHROPLASTY LEFT;  Surgeon: Fernandez Enriquez MD;  Location: [facility];  Service: Orthopedics;  Laterality: Left;     General Information     Row Name 10/23/20 1335 10/23/20 0934       Physical Therapy Time and Intention    Document Type  therapy note (daily note)  -HP  therapy note (daily note)  -HP    Mode of Treatment  physical therapy  -HP  physical therapy  -HP    Row Name 10/23/20 1335 10/23/20 0934       General Information    Patient Profile Reviewed  yes  -HP  yes  -HP    Existing Precautions/Restrictions  fall  -HP  fall  -HP    Barriers to Rehab  none identified  -HP  none identified  -HP    Row Name 10/23/20 1335 10/23/20 0934       Cognition    Orientation Status (Cognition)  oriented x 4  -HP  oriented x 4  -HP    Row Name 10/23/20 1335 10/23/20 0934       Safety Issues, Functional Mobility    Safety Issues Affecting Function (Mobility)  positioning of assistive device;sequencing abilities  -HP  ability to follow  commands;at risk behavior observed;positioning of assistive device;safety precaution awareness;sequencing abilities;steps too close to assistive device  -    Impairments Affecting Function (Mobility)  endurance/activity tolerance;pain;strength  -  endurance/activity tolerance;range of motion (ROM);strength;pain  -    Comment, Safety Issues/Impairments (Mobility)  Pt CGA-min A with mobility. KI used with ambulation to prevent knee buckling. Able to tolerate increased mobility from AM session  -  Pt limited due to pain and knee buckling  -      User Key  (r) = Recorded By, (t) = Taken By, (c) = Cosigned By    Initials Name Provider Type     Ron Arreaga, PT Physical Therapist        Mobility     Row Name 10/23/20 1336 10/23/20 0935       Bed Mobility    Bed Mobility  supine-sit;scooting/bridging  -  --    Scooting/Bridging [place] (Bed Mobility)  verbal cues;1 person assist;contact guard  -  --    Supine-Sit [place] (Bed Mobility)  verbal cues;contact guard;1 person assist  -  --    Assistive Device (Bed Mobility)  bed rails;head of bed elevated  -  --    Comment (Bed Mobility)  Pt able to perform bed mobility with increased independence, required min A with placement of L LE.  -  Pt reclined in chair upon arrival. Bed mobility not performed  -    Row Name 10/23/20 1336 10/23/20 0968       Transfers    Comment (Transfers)  Cuing required for hand placement and sequencing of L LE with transfers. KI applied to improve functional mobility with transfers.  -  VCing required for hand placement, sequencing of L LE for comfort, and posture.  -    Row Name 10/23/20 1336 10/23/20 0988       Sit-Stand Transfer    Sit-Stand [place] (Transfers)  contact guard;verbal cues  -  verbal cues;contact guard;2 person assist  -    Assistive Device (Sit-Stand Transfers)  walker, front-wheeled  -  walker, front-wheeled  -    Row Name 10/23/20 1336 10/23/20 0909       Gait/Stairs  (Locomotion)    [place] Level (Gait)  verbal cues;contact guard;1 person assist  -HP  verbal cues;contact guard;2 person assist  -HP    Assistive Device (Gait)  walker, front-wheeled  -HP  walker, front-wheeled  -HP    Distance in Feet (Gait)  250', KI used  -HP  100', knee immobilizer used  -HP    Deviations/Abnormal Patterns (Gait)  bilateral deviations;edgar decreased;stride length decreased;gait speed decreased;antalgic  -HP  bilateral deviations;edgar decreased;stride length decreased;gait speed decreased;antalgic  -HP    Bilateral Gait Deviations  forward flexed posture  -HP  forward flexed posture  -HP    Left Sided Gait Deviations  heel strike decreased;weight shift ability decreased  -HP  heel strike decreased;weight shift ability decreased  -HP    [place] Level (Stairs)  not tested  -HP  not tested  -HP    Comment (Gait/Stairs)  Pt required vcing in order to remain within walker, as well as pushing walker rather than picking up and placing down. VCing to remain upright as well. Tolerated increased distance without an increase in pain.  -  Pt with step to pattern, forward flexed posture, and with knee buckling. Knee immobilizer applied prior to treatment. VCing to remain upright, within walker, and advancement of L LE.  -    Row Name 10/23/20 1336 10/23/20 0935       Mobility    Extremity Weight-bearing Status  left lower extremity  -HP  left lower extremity  -HP    Left Lower Extremity (Weight-bearing Status)  weight-bearing as tolerated (WBAT)  -HP  weight-bearing as tolerated (WBAT)  -      User Key  (r) = Recorded By, (t) = Taken By, (c) = Cosigned By    Initials Name Provider Type     Ron Arreaga, PT Physical Therapist        Obj/Interventions     Row Name 10/23/20 1334 10/23/20 0978       Motor Skills    Therapeutic Exercise  hip;knee;ankle  -  hip;knee;ankle  -    Row Name 10/23/20 1339 10/23/20 0978       Hip (Therapeutic Exercise)    Hip (Therapeutic Exercise)   isometric exercises;strengthening exercise  -  --    Hip Isometrics (Therapeutic Exercise)  gluteal sets;10 repetitions  -  gluteal sets;10 repetitions;flexion  -    Hip Strengthening (Therapeutic Exercise)  flexion;10 repetitions  -  --    Row Name 10/23/20 1339 10/23/20 0938       Knee (Therapeutic Exercise)    Knee (Therapeutic Exercise)  AROM (active range of motion);isometric exercises  -  --    Knee AROM (Therapeutic Exercise)  SLR (straight leg raise);10 repetitions  -  --    Knee Isometrics (Therapeutic Exercise)  quad sets;10 repetitions  -  quad sets;10 repetitions  -    Row Name 10/23/20 1339 10/23/20 0938       Ankle (Therapeutic Exercise)    Ankle (Therapeutic Exercise)  AROM (active range of motion)  -  --    Ankle AROM (Therapeutic Exercise)  bilateral;dorsiflexion;plantarflexion;10 repetitions  -  bilateral;dorsiflexion;plantarflexion;10 repetitions  -    Row Name 10/23/20 1339 10/23/20 0938       Balance    Balance Assessment  sitting static balance;sitting dynamic balance;standing static balance;standing dynamic balance  -  sitting static balance;sitting dynamic balance;standing static balance;standing dynamic balance  -    Static Sitting Balance  WNL  -  WNL  -    Dynamic Sitting Balance  WNL  -  WNL  -    Static Standing Balance  WNL  -  WNL  -    Dynamic Standing Balance  WNL;mild impairment  -  mild impairment  -    Balance Interventions  sitting;standing;sit to stand;supported;static;dynamic;minimal challenge  -  sitting;standing;sit to stand;supported;static;dynamic;minimal challenge  -    Comment, Balance  Pt with KI in order to improve balance/functional mobility.  -HP  --      User Key  (r) = Recorded By, (t) = Taken By, (c) = Cosigned By    Initials Name Provider Type    HP Rno Arreaga PT Physical Therapist        Goals/Plan    No documentation.       Clinical Impression     Row Name 10/23/20 1341 10/23/20 0940       Pain    Additional  Documentation  Pain Scale: Numbers Pre/Post-Treatment (Group)  -HP  Pain Scale: Numbers Pre/Post-Treatment (Group)  -HP    Row Name 10/23/20 1341 10/23/20 0940       Pain Scale: Numbers Pre/Post-Treatment    Pretreatment Pain Rating  6/10  -HP  9/10  -HP    Posttreatment Pain Rating  6/10  -HP  9/10  -HP    Pain Location - Side  Left  -HP  Left  -HP    Pain Location - Orientation  incisional  -HP  anterior  -HP    Pain Location  knee  -HP  knee  -HP    Pain Intervention(s)  Repositioned;Ambulation/increased activity  -HP  Ambulation/increased activity;Repositioned  -HP    Row Name 10/23/20 1341 10/23/20 0940       Plan of Care Review    Plan of Care Reviewed With  patient  -HP  patient  -HP    Progress  improving  -HP  improving  -HP    Outcome Summary  Pt was able to tolerate an increased distance ambulated to 250' with RW and CGA. VCing required in order for pt to remain upright, within walker, and to push walker with ambulation. Pt able to demonstrate ability to perform STS transfer from toilet seat. Recommend pt d/c home with assistance and use of KI to ensure no knee buckling until strength regains.  -HP  Pt with severe complaints of pain prior to treatment. Pt CGA with ambulation, VCing required to remain upright and within walker. Knee immobilizer applied in order to prevent knee buckling of L knee. Pt increased distance walked to 100' compared to previous treatment.  -    Row Name 10/23/20 1341 10/23/20 0940       Therapy Assessment/Plan (PT)    Patient/Family Therapy Goals Statement (PT)  --  to return home  -HP    Rehab Potential (PT)  good, to achieve stated therapy goals  -HP  good, to achieve stated therapy goals  -    Criteria for Skilled Interventions Met (PT)  yes;meets criteria;skilled treatment is necessary  -HP  yes;meets criteria;skilled treatment is necessary  -    Row Name 10/23/20 1341 10/23/20 0940       Positioning and Restraints    Pre-Treatment Position  in bed  -HP  sitting in  chair/recliner  -HP    Post Treatment Position  bed  -HP  chair  -HP    In Bed  notified nsg;supine;call light within reach;encouraged to call for assist  -HP  --    In Chair  --  notified nsg;reclined;call light within reach;encouraged to call for assist;exit alarm on  -HP      User Key  (r) = Recorded By, (t) = Taken By, (c) = Cosigned By    Initials Name Provider Type    Ron Vargas PT Physical Therapist        Outcome Measures     Row Name 10/23/20 1344 10/23/20 0942       How much help from another person do you currently need...    Turning from your back to your side while in flat bed without using bedrails?  3  -HP  2  -HP    Moving from lying on back to sitting on the side of a flat bed without bedrails?  3  -HP  2  -HP    Moving to and from a bed to a chair (including a wheelchair)?  3  -HP  3  -HP    Standing up from a chair using your arms (e.g., wheelchair, bedside chair)?  3  -HP  3  -HP    Climbing 3-5 steps with a railing?  3  -HP  2  -HP    To walk in hospital room?  3  -HP  3  -HP    AM-PAC 6 Clicks Score (PT)  18  -HP  15  -HP    Row Name 10/23/20 1344 10/23/20 0942       Functional Assessment    Outcome Measure Options  AM-PAC 6 Clicks Basic Mobility (PT)  -HP  AM-PAC 6 Clicks Basic Mobility (PT)  -HP      User Key  (r) = Recorded By, (t) = Taken By, (c) = Cosigned By    Initials Name Provider Type    Ron Vargas PT Physical Therapist        Physical Therapy Education                 Title: PT OT SLP Therapies (In Progress)     Topic: Physical Therapy (Done)     Point: Mobility training (Done)     Learning Progress Summary           Patient Acceptance, E, VU by  at 10/23/2020 1344    Acceptance, E, VU by  at 10/23/2020 0943    Acceptance, E,D, VU by  at 10/22/2020 1310    Comment: Educated on safe sequencing with bed mobility, ambulatory transfers, and gait. Reviewed HEP and knee precautions.   Significant Other Acceptance, E,D, VU by ELISA at 10/22/2020 1310    Comment: Educated  on safe sequencing with bed mobility, ambulatory transfers, and gait. Reviewed HEP and knee precautions.                   Point: Home exercise program (Done)     Learning Progress Summary           Patient Acceptance, E, VU by  at 10/23/2020 1344    Acceptance, E, VU by  at 10/23/2020 0943    Acceptance, E,D, VU by  at 10/22/2020 1310    Comment: Educated on safe sequencing with bed mobility, ambulatory transfers, and gait. Reviewed HEP and knee precautions.   Significant Other Acceptance, E,D, VU by  at 10/22/2020 1310    Comment: Educated on safe sequencing with bed mobility, ambulatory transfers, and gait. Reviewed HEP and knee precautions.                   Point: Body mechanics (Done)     Learning Progress Summary           Patient Acceptance, E, VU by  at 10/23/2020 1344    Acceptance, E, VU by  at 10/23/2020 0943    Acceptance, E,D, VU by  at 10/22/2020 1310    Comment: Educated on safe sequencing with bed mobility, ambulatory transfers, and gait. Reviewed HEP and knee precautions.   Significant Other Acceptance, E,D, VU by  at 10/22/2020 1310    Comment: Educated on safe sequencing with bed mobility, ambulatory transfers, and gait. Reviewed HEP and knee precautions.                   Point: Precautions (Done)     Learning Progress Summary           Patient Acceptance, E, VU by  at 10/23/2020 1344    Acceptance, E, VU by  at 10/23/2020 0943    Acceptance, E,D, VU by  at 10/22/2020 1310    Comment: Educated on safe sequencing with bed mobility, ambulatory transfers, and gait. Reviewed HEP and knee precautions.   Significant Other Acceptance, E,D, VU by  at 10/22/2020 1310    Comment: Educated on safe sequencing with bed mobility, ambulatory transfers, and gait. Reviewed HEP and knee precautions.                               User Key     Initials Effective Dates Name Provider Type Discipline     09/10/19 -  Emre Rea, PT Physical Therapist PT     09/22/20 -  Ron Arreaga, PT  Physical Therapist PT              PT Recommendation and Plan     Plan of Care Reviewed With: patient  Progress: improving  Outcome Summary: Pt was able to tolerate an increased distance ambulated to 250' with RW and CGA. VCing required in order for pt to remain upright, within walker, and to push walker with ambulation. Pt able to demonstrate ability to perform STS transfer from toilet seat. Recommend pt d/c home with assistance and use of KI to ensure no knee buckling until strength regains.     Time Calculation:   PT Charges     Row Name 10/23/20 1311 10/23/20 0858          Time Calculation    Start Time  1311  -HP  0858  -HP     PT Received On  10/23/20  -HP  10/23/20  -HP     PT Goal Re-Cert Due Date  11/01/20  -HP  11/01/20  -HP        Time Calculation- PT    Total Timed Code Minutes- PT  232 minute(s)  -HP  23 minute(s)  -HP        Timed Charges    85377 - PT Therapeutic Exercise Minutes  8  -HP  8  -HP     53064 - Gait Training Minutes   15  -HP  15  -HP       User Key  (r) = Recorded By, (t) = Taken By, (c) = Cosigned By    Initials Name Provider Type     Ron Arreaga, PT Physical Therapist        Therapy Charges for Today     Code Description Service Date Service Provider Modifiers Qty    99976684779 HC PT THER PROC EA 15 MIN 10/23/2020 Ron Arreaga, PT GP 1    71073341050 HC GAIT TRAINING EA 15 MIN 10/23/2020 Ron Arreaga, PT GP 1    01161782679 HC PT THER SUPP EA 15 MIN 10/23/2020 Ron Arreaga, PT GP 2    07547397735 HC PT THER PROC EA 15 MIN 10/23/2020 Ron Arreaga, PT GP 1    87428244587 HC GAIT TRAINING EA 15 MIN 10/23/2020 Ron Arreaga, PT GP 1          PT G-Codes  Outcome Measure Options: AM-PAC 6 Clicks Basic Mobility (PT)  AM-PAC 6 Clicks Score (PT): 18  AM-PAC 6 Clicks Score (OT): 19    Ron Arreaga PT  10/23/2020

## 2020-10-23 NOTE — PLAN OF CARE
Problem: Adult Inpatient Plan of Care  Goal: Plan of Care Review  Recent Flowsheet Documentation  Taken 10/23/2020 1341 by Ron Arreaga PT  Progress: improving  Plan of Care Reviewed With: patient  Outcome Summary: Pt was able to tolerate an increased distance ambulated to 250' with RW and CGA. VCing required in order for pt to remain upright, within walker, and to push walker with ambulation. Pt able to demonstrate ability to perform STS transfer from toilet seat. Recommend pt d/c home with assistance and use of KI to ensure no knee buckling until strength regains.

## 2020-10-23 NOTE — PLAN OF CARE
Problem: Adult Inpatient Plan of Care  Goal: Plan of Care Review  Recent Flowsheet Documentation  Taken 10/23/2020 0940 by Ron Arreaga, PT  Progress: improving  Plan of Care Reviewed With: patient  Outcome Summary: Pt with severe complaints of pain prior to treatment. Pt CGA with ambulation, VCing required to remain upright and within walker. Knee immobilizer applied in order to prevent knee buckling of L knee. Pt increased distance walked to 100' compared to previous treatment.

## 2020-10-23 NOTE — PROGRESS NOTES
"/72 (BP Location: Right arm, Patient Position: Lying)   Pulse 85   Temp 97.6 °F (36.4 °C) (Oral)   Resp 18   Ht 165.1 cm (65\")   Wt 99.8 kg (220 lb)   SpO2 94%   BMI 36.61 kg/m²     Lab Results (last 24 hours)     Procedure Component Value Units Date/Time    Potassium [[id number]]  (Normal) Collected: 10/22/20 1156    Specimen: Blood Updated: 10/22/20 1259     Potassium 4.7 mmol/L           Imaging Results (Last 24 Hours)     Procedure Component Value Units Date/Time    XR Knee 1 or 2 View Left [[id number]] Collected: 10/22/20 1008     Updated: 10/22/20 1020    Narrative:      EXAMINATION: XR KNEE 1 OR 2 VW LEFT-     INDICATION: Postop knee arthoplasty.     COMPARISON: None.     FINDINGS: Total left knee prosthesis is seen in anatomic alignment. No  abnormal lucency is seen around the prosthetic components. No fracture  line or bony avulsion is appreciated. There is expected postop soft  tissue air.       Impression:      Total left knee prosthesis in anatomic alignment. No acute  bony abnormality is seen.      D:  10/22/2020  E:  10/22/2020             Patient Care Team:  Aracely Schneider MD as PCP - General  Aracely Schneider MD as PCP - Family Medicine    SUBJECTIVE: She reports she is doing well.  Pain is well controlled.  She walked 5 feet with therapy yesterday, limited by pain.    PHYSICAL EXAM  Left knee incision is clean, dry and intact with mesh dressing in place  Thigh and calf soft and nontender  Neurovascular intact distally       Status post total left knee replacement    Primary osteoarthritis of left knee    Hypertension    Hyperlipidemia    GERD (gastroesophageal reflux disease)      PLAN / DISPOSITION: 70-year-old female postop day #1 status post left total knee arthroplasty  -Mobilize with therapy as tolerated  -Aspirin for DVT prophylaxis  -Okay to shower with mesh dressing in place once pain catheter removed  -Anticipate discharge home today.  Home physical therapy with [facility] then outpatient at [facility]  -Follow-up in 2 weeks    Fernandez Enriquez MD  10/23/20  07:42 EDT

## 2020-10-23 NOTE — PROGRESS NOTES
Discharge Planning Assessment  [facility]     Patient Name: Flory Haas  MRN: [id number]  Today's Date: 10/23/2020    Admit Date: 10/22/2020    Discharge Needs Assessment     Row Name 10/23/20 0954       Living Environment    Lives With  significant other;child(meredith), dependent    Name(s) of Who Lives With Patient  [place] ( significant other) [phone]    Current Living Arrangements  home/apartment/condo    Primary Care Provided by  self    Provides Primary Care For  no one    Family Caregiver if Needed  significant other    Quality of Family Relationships  supportive;involved    Able to Return to Prior Arrangements  yes       Resource/Environmental Concerns    Resource/Environmental Concerns  none    Transportation Concerns  car, none       Transition Planning    Patient/Family Anticipates Transition to  home with family    Patient/Family Anticipated Services at Transition  outpatient care    Transportation Anticipated  family or friend will provide       Discharge Needs Assessment    Readmission Within the Last 30 Days  no previous admission in last 30 days    Equipment Currently Used at Home  walker, rolling also has raised toilet seat    Concerns to be Addressed  discharge planning;basic needs    Equipment Needed After Discharge  none    Outpatient/Agency/Support Group Needs  outpatient therapy    Provided Post Acute Provider List?  N/A    N/A Provider List Comment  pre admit referral to [facility] by MD office        Discharge Plan     Row Name 10/23/20 0957       Plan    Plan  Home with outpt PT    Patient/Family in Agreement with Plan  yes    Plan Comments  I met with Ms Haas to discuss d/c plan. Her plan is to return home. She lives with her fiance and teenage children who can assist prn. She already has a rolling walker and raised toilet seat. She was independent with all ADL's prior to admit. Options for PT discussed. she has a pre admission referral to [facility] by MD office, she is in agreement with  this. [facility] will see her at [facility] until she is ready to transition to outpt PT. No other d/c needs identified.    Final Discharge Disposition Code  01 - home or self-care        Continued Care and Services - Admitted Since 10/22/2020     Therapy Coordination complete    Service Provider Request Status Selected Services Address Phone Fax    [facility] [place]   Selected Outpatient Physical Therapy [address] [place] [id number] [phone]                Demographic Summary     Row Name 10/23/20 0949       General Information    Admission Type  observation    Arrived From  home    Reason for Consult  discharge planning    Preferred Language  English    General Information Comments  PCP- Aracely Schneider       Contact Information    Permission Granted to Share Info With          Functional Status     Row Name 10/23/20 0954       Functional Status    Usual Activity Tolerance  good    Current Activity Tolerance  -- see therapy notes       Functional Status, IADL    Medications  independent    Meal Preparation  independent    Housekeeping  independent    Laundry  independent    Shopping  independent       Mental Status    General Appearance WDL  WDL       Mental Status Summary    Recent Changes in Mental Status/Cognitive Functioning  no changes       Employment/    Employment Status  retired    Employment/ Comments  insurance- Medicare & [employer]        Psychosocial    No documentation.       Abuse/Neglect    No documentation.       Legal    No documentation.       Substance Abuse    No documentation.       Patient Forms    No documentation.           Sonja C Kellerman, RN

## 2020-10-23 NOTE — PLAN OF CARE
Problem: Adult Inpatient Plan of Care  Goal: Plan of Care Review  Recent Flowsheet Documentation  Taken 10/23/2020 1315 by Denisa Horton OT  Progress: improving  Plan of Care Reviewed With: patient  Outcome Summary: OT IE completed post chart review. Pt presents with decreased I in ADLs, related t/f compared to PLOF, grossly limited by pain and knee buckling at LLE once observed upright and completing fxl mobility. Additionally limited by decreased fxl endurance and balance impairments. Pt completed bed mobility with min A for LE advancement, sit < > stand at bed with CGA, min A for fxl t/f to recliner with FWW throughout, further mobility did not progress d/t need for KI. Pt able to d/d socks, pants, undergarments with MI for increased time, requiring no AE. Issued leg , reacher for optimal positioning of LLE and to use for safe reach and to manipulate self care items as needed, further sponge for LBB. Pt would benefit from IPOT POC for further ADL retraining including toileting training as u/a to assess this date. Pt did require min A for balance in upward mgmt of pants. Will recommend home with assistance once medically ready.

## 2020-10-23 NOTE — DISCHARGE SUMMARY
Patient Name: Flory Haas  MRN: [id number]  : 1950  DOS: 10/23/2020    Attending: Fernandez Enriquez,*    Primary Care Provider: Aracely Schneider MD    Date of Admission:.10/22/2020  5:47 AM    Date of Discharge:  10/23/2020    Discharge Diagnosis:   Status post total left knee replacement    Primary osteoarthritis of left knee    Hypertension    Hyperlipidemia    GERD (gastroesophageal reflux disease)    Acute blood loss anemia, mild, asymptomatic    Acute postoperative pain    Leukocytosis, mild, likely reactive      Hospital Course    At admit:    Patient is a pleasant 70 y.o. female presented for scheduled surgery by Dr. Enriquez.  She underwent left total knee arthroplasty under spinal anesthesia.  She tolerated surgery well was admitted for further medical management.  Her knee has been painful for many years.  She denies use of assistive device for ambulation.  She does report recent falls.     After admit:    Patient was provided pain medications as needed for pain control, along with adductor canal nerve block infusion of Ropivacaine.    Adjustments were made to pain medications to optimize postop pain management.   Risks and benefits of opiate medications discussed with patient.  Saeed report in chart was reviewed prior to discharge.    She was seen by PT and OT and has progressed well over her stay.    She used an IS for atelectasis prophylaxis and aspirin along with mechanicals for DVT prophylaxis.  Home medications were resumed as appropriate, and labs were monitored and remained fairly stable.     With the progress she has made, she is ready for DC home today.      She will have an Arrow pump (instructed on it during this admit).    Discussed with patient regarding plan and she shows understanding and agreement.    Patient will have HHPT following discharge.      Procedures Performed    Preoperative diagnosis:Left knee end stage osteoarthritis     Postoperative diagnosis: Left knee end  "stage osteoarthritis     Operative procedure: Left total knee arthroplasty     Surgeon: Dr. Dylan Enriquez    Pertinent Test Results:    I reviewed the patient's new clinical results.   Results from last 7 days   Lab Units 10/23/20  1002   WBC 10*3/mm3 11.53*   HEMOGLOBIN g/dL 11.2*   HEMATOCRIT % 34.6   PLATELETS 10*3/mm3 208     Results for RACHEL SHIRLEY (MRN [id number]) as of 10/23/2020 14:02   Ref. Range 10/14/2020 10:19   Hemoglobin Latest Ref Range: 12.0 - 15.9 g/dL 13.7   Hematocrit Latest Ref Range: 34.0 - 46.6 % 41.0     Results from last 7 days   Lab Units 10/23/20  1002 10/22/20  1156 10/22/20  0550   SODIUM mmol/L 136  --   --    POTASSIUM mmol/L 3.7 4.7 4.1   CHLORIDE mmol/L 99  --   --    CO2 mmol/L 27.0  --   --    BUN mg/dL 23  --   --    CREATININE mg/dL 0.89  --   --    CALCIUM mg/dL 9.1  --   --    GLUCOSE mg/dL 95  --   --      I reviewed the patient's new imaging including images and reports.      Physical therapy: Pt was able to tolerate an increased distance ambulated to 250' with RW and CGA. VCing required in order for pt to remain upright, within walker, and to push walker with ambulation. Pt able to demonstrate ability to perform STS transfer from toilet seat. Recommend pt d/c home with assistance and use of KI to ensure no knee buckling until strength regains.      Discharge Assessment:    Vital Signs  Visit Vitals  /86   Pulse 89   Temp 98.7 °F (37.1 °C) (Oral)   Resp 16   Ht 165.1 cm (65\")   Wt 99.8 kg (220 lb)   SpO2 92%   BMI 36.61 kg/m²     Temp (24hrs), Av.1 °F (36.7 °C), Min:97.6 °F (36.4 °C), Max:98.7 °F (37.1 °C)      General Appearance:    Alert, cooperative, in no acute distress   Lungs:     Clear to auscultation,respirations regular, even and unlabored    Heart:    Regular rhythm and normal rate, normal S1 and S2   Abdomen:     Normal bowel sounds, no masses, no organomegaly, soft non-tender, non-distended, no guarding, no rebound tenderness   Extremities:   CDI dressing " surgical knee, left. ACB cath present, arrow pump. Moves all extremities well, no edema, no cyanosis, no redness   Pulses:   Pulses palpable and equal bilaterally   Skin:   No bleeding, bruising or rash   Neurologic:   Cranial nerves 2 - 12 grossly intact, sensation intact, Flexion and dorsiflexion intact bilateral feet.       Discharge Disposition: Home    Discharge Medications:     Discharge Medications      New Medications      Instructions Start Date   aspirin 325 MG EC tablet   325 mg, Oral, Daily, For 1 month   Start Date: October 24, 2020     docusate sodium 100 MG capsule  Commonly known as: Colace   100 mg, Oral, 2 Times Daily      oxyCODONE 10 MG tablet  Commonly known as: ROXICODONE   10 mg, Oral, Every 4 Hours PRN      Ropivacine HCl-NaCl  Commonly known as: NAROPIN   10 mL/hr (20 mg/hr), Peripheral Nerve, Continuous         Changes to Medications      Instructions Start Date   celecoxib 200 MG capsule  Commonly known as: CeleBREX  What changed: additional instructions   200 mg, Oral, 2 Times Daily, Must take an hour after aspirin if needed.         Continue These Medications      Instructions Start Date   CALTRATE 600+D PO   600 mg, Oral, Daily      DULoxetine 60 MG capsule  Commonly known as: CYMBALTA   60 mg, Oral, Daily      fenofibrate 160 MG tablet   160 mg, Oral, Daily      fish oil 1000 MG capsule capsule   1,000 mg, Oral, Daily With Breakfast      irbesartan-hydrochlorothiazide 300-12.5 MG tablet  Commonly known as: AVALIDE   1 tablet, Oral, Daily      Lyrica 150 MG capsule  Generic drug: pregabalin   150 mg, Oral, 2 Times Daily      MULTIVITAMIN ADULT PO   1 tablet, Oral, Daily      omeprazole 40 MG capsule  Commonly known as: priLOSEC   40 mg, Oral, Daily      potassium chloride 20 MEQ CR tablet  Commonly known as: K-DUR,KLOR-CON   20 mEq, Oral, Daily      tiZANidine 4 MG tablet  Commonly known as: ZANAFLEX   4 mg, Oral, Nightly PRN      valACYclovir 500 MG tablet  Commonly known as: VALTREX    500 mg, Oral, 2 Times Daily      vitamin E 1000 UNIT capsule   1,000 Units, Oral, Daily         Stop These Medications    oxyCODONE-acetaminophen 7.5-325 MG per tablet  Commonly known as: PERCOCET            Discharge Diet: Regular diet      Activity at Discharge: WBAT LLE      Follow-up Appointments:  Dr. Enriquez per his orders      MAGGI Sharma  10/23/20  14:02 EDT

## 2020-10-23 NOTE — PLAN OF CARE
Problem: Adult Inpatient Plan of Care  Goal: Plan of Care Review  Outcome: Met  Goal: Patient-Specific Goal (Individualized)  Outcome: Met  Goal: Absence of Hospital-Acquired Illness or Injury  Outcome: Met  Intervention: Identify and Manage Fall Risk  Recent Flowsheet Documentation  Taken 10/23/2020 1600 by Margaux Rubalcava, RN  Safety Promotion/Fall Prevention:   activity supervised   assistive device/personal items within reach   clutter free environment maintained   fall prevention program maintained   gait belt   toileting scheduled   safety round/check completed   room organization consistent   nonskid shoes/slippers when out of bed  Taken 10/23/2020 1416 by Margaux Rubalcava, RN  Safety Promotion/Fall Prevention:   activity supervised   assistive device/personal items within reach   clutter free environment maintained   fall prevention program maintained   gait belt   toileting scheduled   safety round/check completed   room organization consistent   nonskid shoes/slippers when out of bed  Taken 10/23/2020 1210 by Margaux Rubalcava, RN  Safety Promotion/Fall Prevention:   activity supervised   assistive device/personal items within reach   clutter free environment maintained   fall prevention program maintained   gait belt   toileting scheduled   safety round/check completed   room organization consistent   nonskid shoes/slippers when out of bed  Taken 10/23/2020 1030 by Margaux Rubalcava, RN  Safety Promotion/Fall Prevention:   activity supervised   assistive device/personal items within reach   clutter free environment maintained   fall prevention program maintained   gait belt   toileting scheduled   safety round/check completed   room organization consistent   nonskid shoes/slippers when out of bed  Taken 10/23/2020 0856 by Margaux Rubalcava, RN  Safety Promotion/Fall Prevention:   activity supervised   assistive device/personal items within reach   clutter free environment maintained   fall prevention program  maintained   gait belt   toileting scheduled   safety round/check completed   room organization consistent   nonskid shoes/slippers when out of bed  Intervention: Prevent Skin Injury  Recent Flowsheet Documentation  Taken 10/23/2020 1600 by Margaux Rubalcava RN  Body Position: sitting up in bed  Taken 10/23/2020 1416 by Margaux Rubalcava RN  Body Position: sitting up in bed  Taken 10/23/2020 1210 by Margaux Rubalcava RN  Body Position: sitting up in bed  Taken 10/23/2020 1030 by Margaux Rubalcava RN  Body Position: sitting up in bed  Taken 10/23/2020 0856 by Margaux Rubalcava RN  Body Position: (up in chair) other (see comments)  Intervention: Prevent and Manage VTE (venous thromboembolism) Risk  Recent Flowsheet Documentation  Taken 10/23/2020 1600 by Margaux Rubalcava RN  VTE Prevention/Management:   bilateral   sequential compression devices off  Taken 10/23/2020 1416 by Margaux Rubalcava RN  VTE Prevention/Management:   bilateral   sequential compression devices off  Taken 10/23/2020 1210 by Margaux Rubalcava RN  VTE Prevention/Management:   right   sequential compression devices on  Taken 10/23/2020 1030 by Margaux Rubalcava RN  VTE Prevention/Management:   right   sequential compression devices on  Taken 10/23/2020 0856 by Margaux Rubalcava RN  VTE Prevention/Management:   bilateral   sequential compression devices on  Intervention: Prevent Infection  Recent Flowsheet Documentation  Taken 10/23/2020 1600 by Margaux Rubalcava RN  Infection Prevention: environmental surveillance performed  Taken 10/23/2020 1416 by Margaux Rubalcava RN  Infection Prevention: environmental surveillance performed  Taken 10/23/2020 1210 by Margaux Rubalcava RN  Infection Prevention: environmental surveillance performed  Taken 10/23/2020 1030 by Margaux Rubalcava RN  Infection Prevention: environmental surveillance performed  Taken 10/23/2020 0856 by Margaux Rubalcava RN  Infection Prevention: environmental surveillance performed  Goal: Optimal  Comfort and Wellbeing  Outcome: Met  Intervention: Provide Person-Centered Care  Recent Flowsheet Documentation  Taken 10/23/2020 1600 by Margaux Rubalcava RN  Trust Relationship/Rapport: care explained  Taken 10/23/2020 1416 by Margaux Rubalcava RN  Trust Relationship/Rapport: care explained  Taken 10/23/2020 1210 by Margaux Rubalcava RN  Trust Relationship/Rapport: care explained  Taken 10/23/2020 1030 by Margaux Rubalcava RN  Trust Relationship/Rapport: care explained  Taken 10/23/2020 0856 by Margaux Rubalcava RN  Trust Relationship/Rapport:   care explained   choices provided   questions encouraged   questions answered   reassurance provided   thoughts/feelings acknowledged  Goal: Readiness for Transition of Care  Outcome: Met     Problem: Fall Injury Risk  Goal: Absence of Fall and Fall-Related Injury  Outcome: Met  Intervention: Identify and Manage Contributors to Fall Injury Risk  Recent Flowsheet Documentation  Taken 10/23/2020 0856 by Margaux Rubalcava, RN  Medication Review/Management: medications reviewed  Intervention: Promote Injury-Free Environment  Recent Flowsheet Documentation  Taken 10/23/2020 1600 by Margaux Rubalcava RN  Safety Promotion/Fall Prevention:   activity supervised   assistive device/personal items within reach   clutter free environment maintained   fall prevention program maintained   gait belt   toileting scheduled   safety round/check completed   room organization consistent   nonskid shoes/slippers when out of bed  Taken 10/23/2020 1416 by Margaux Rubalcava, RN  Safety Promotion/Fall Prevention:   activity supervised   assistive device/personal items within reach   clutter free environment maintained   fall prevention program maintained   gait belt   toileting scheduled   safety round/check completed   room organization consistent   nonskid shoes/slippers when out of bed  Taken 10/23/2020 1210 by Margaux Rubalcava, RN  Safety Promotion/Fall Prevention:   activity supervised   assistive  device/personal items within reach   clutter free environment maintained   fall prevention program maintained   gait belt   toileting scheduled   safety round/check completed   room organization consistent   nonskid shoes/slippers when out of bed  Taken 10/23/2020 1030 by Margaux Rubalcava, RN  Safety Promotion/Fall Prevention:   activity supervised   assistive device/personal items within reach   clutter free environment maintained   fall prevention program maintained   gait belt   toileting scheduled   safety round/check completed   room organization consistent   nonskid shoes/slippers when out of bed  Taken 10/23/2020 0856 by Margaux Rubalcava, RN  Safety Promotion/Fall Prevention:   activity supervised   assistive device/personal items within reach   clutter free environment maintained   fall prevention program maintained   gait belt   toileting scheduled   safety round/check completed   room organization consistent   nonskid shoes/slippers when out of bed   Goal Outcome Evaluation:  Plan of Care Reviewed With: patient  Progress: improving

## 2020-10-23 NOTE — THERAPY TREATMENT NOTE
Patient Name: Flory Haas  : 1950    MRN: [id number]                              Today's Date: 10/23/2020       Admit Date: 10/22/2020    Visit Dx:     ICD-10-CM ICD-9-CM   1. Status post total left knee replacement  Z96.652 V43.65     Patient Active Problem List   Diagnosis   • Primary osteoarthritis of left knee   • Status post total left knee replacement   • Hypertension   • Hyperlipidemia   • GERD (gastroesophageal reflux disease)     Past Medical History:   Diagnosis Date   • Arthritis    • GERD (gastroesophageal reflux disease)    • Hyperlipidemia    • Hypertension    • Migraines    • Syncope     about a year ago patient fainted upon standing too quickly. MD informed and did not find cause     Past Surgical History:   Procedure Laterality Date   • BREAST CYST ASPIRATION Right  ??   • COLONOSCOPY      patient reports several years ago she had a colonscopy   • HYSTERECTOMY     • OOPHORECTOMY     • TOTAL KNEE ARTHROPLASTY Left 10/22/2020    Procedure: TOTAL KNEE ARTHROPLASTY LEFT;  Surgeon: Fernandez Enriquez MD;  Location: [facility];  Service: Orthopedics;  Laterality: Left;     General Information     Row Name 10/23/20 0934          Physical Therapy Time and Intention    Document Type  therapy note (daily note)  -     Mode of Treatment  physical therapy  -     Row Name 10/23/20 0934          General Information    Patient Profile Reviewed  yes  -HP     Existing Precautions/Restrictions  fall  -     Barriers to Rehab  none identified  -     Row Name 10/23/20 0934          Cognition    Orientation Status (Cognition)  oriented x 4  -HP     Row Name 10/23/20 0934          Safety Issues, Functional Mobility    Safety Issues Affecting Function (Mobility)  ability to follow commands;at risk behavior observed;positioning of assistive device;safety precaution awareness;sequencing abilities;steps too close to assistive device  -     Impairments Affecting Function (Mobility)   endurance/activity tolerance;range of motion (ROM);strength;pain  -     Comment, Safety Issues/Impairments (Mobility)  Pt limited due to pain and knee buckling  -       User Key  (r) = Recorded By, (t) = Taken By, (c) = Cosigned By    Initials Name Provider Type     Ron Arreaga, PT Physical Therapist        Mobility     Row Name 10/23/20 0935          Bed Mobility    Comment (Bed Mobility)  Pt reclined in chair upon arrival. Bed mobility not performed  -     Row Name 10/23/20 0935          Transfers    Comment (Transfers)  VCing required for hand placement, sequencing of L LE for comfort, and posture.  -     Row Name 10/23/20 0935          Sit-Stand Transfer    Sit-Stand Maple (Transfers)  verbal cues;contact guard;2 person assist  -     Assistive Device (Sit-Stand Transfers)  walker, front-wheeled  -     Row Name 10/23/20 0935          Gait/Stairs (Locomotion)    Maple Level (Gait)  verbal cues;contact guard;2 person assist  -     Assistive Device (Gait)  walker, front-wheeled  -     Distance in Feet (Gait)  100', knee immobilizer used  -     Deviations/Abnormal Patterns (Gait)  bilateral deviations;edgar decreased;stride length decreased;gait speed decreased;antalgic  -     Bilateral Gait Deviations  forward flexed posture  -     Left Sided Gait Deviations  heel strike decreased;weight shift ability decreased  -     Maple Level (Stairs)  not tested  -     Comment (Gait/Stairs)  Pt with step to pattern, forward flexed posture, and with knee buckling. Knee immobilizer applied prior to treatment. VCing to remain upright, within walker, and advancement of L LE.  -     Row Name 10/23/20 0935          Mobility    Extremity Weight-bearing Status  left lower extremity  -     Left Lower Extremity (Weight-bearing Status)  weight-bearing as tolerated (WBAT)  -       User Key  (r) = Recorded By, (t) = Taken By, (c) = Cosigned By    Initials Name Provider Type      Ron Arreaga, PT Physical Therapist        Obj/Interventions     Row Name 10/23/20 0938          Motor Skills    Therapeutic Exercise  hip;knee;ankle  -     Row Name 10/23/20 0938          Hip (Therapeutic Exercise)    Hip Isometrics (Therapeutic Exercise)  gluteal sets;10 repetitions;flexion  -     Row Name 10/23/20 0938          Knee (Therapeutic Exercise)    Knee Isometrics (Therapeutic Exercise)  quad sets;10 repetitions  -     Row Name 10/23/20 0938          Ankle (Therapeutic Exercise)    Ankle AROM (Therapeutic Exercise)  bilateral;dorsiflexion;plantarflexion;10 repetitions  -     Row Name 10/23/20 0938          Balance    Balance Assessment  sitting static balance;sitting dynamic balance;standing static balance;standing dynamic balance  -     Static Sitting Balance  WNL  -     Dynamic Sitting Balance  WNL  -     Static Standing Balance  WNL  -     Dynamic Standing Balance  mild impairment  -     Balance Interventions  sitting;standing;sit to stand;supported;static;dynamic;minimal challenge  -       User Key  (r) = Recorded By, (t) = Taken By, (c) = Cosigned By    Initials Name Provider Type     Ron Arreaga, PT Physical Therapist        Goals/Plan    No documentation.       Clinical Impression     Row Name 10/23/20 0940          Pain    Additional Documentation  Pain Scale: Numbers Pre/Post-Treatment (Group)  -     Row Name 10/23/20 0940          Pain Scale: Numbers Pre/Post-Treatment    Pretreatment Pain Rating  9/10  -     Posttreatment Pain Rating  9/10  -     Pain Location - Side  Left  -     Pain Location - Orientation  anterior  -     Pain Location  knee  -     Pain Intervention(s)  Ambulation/increased activity;Repositioned  -     Row Name 10/23/20 0940          Plan of Care Review    Plan of Care Reviewed With  patient  -     Progress  improving  -     Outcome Summary  Pt with severe complaints of pain prior to treatment. Pt CGA with ambulation, VCing  required to remain upright and within walker. Knee immobilizer applied in order to prevent knee buckling of L knee. Pt increased distance walked to 100' compared to previous treatment.  -     Row Name 10/23/20 0940          Therapy Assessment/Plan (PT)    Patient/Family Therapy Goals Statement (PT)  to return home  -HP     Rehab Potential (PT)  good, to achieve stated therapy goals  -HP     Criteria for Skilled Interventions Met (PT)  yes;meets criteria;skilled treatment is necessary  -HP     Row Name 10/23/20 0940          Positioning and Restraints    Pre-Treatment Position  sitting in chair/recliner  -HP     Post Treatment Position  chair  -HP     In Chair  notified nsg;reclined;call light within reach;encouraged to call for assist;exit alarm on  -HP       User Key  (r) = Recorded By, (t) = Taken By, (c) = Cosigned By    Initials Name Provider Type    Ron Vargas PT Physical Therapist        Outcome Measures     Row Name 10/23/20 0942          How much help from another person do you currently need...    Turning from your back to your side while in flat bed without using bedrails?  2  -HP     Moving from lying on back to sitting on the side of a flat bed without bedrails?  2  -HP     Moving to and from a bed to a chair (including a wheelchair)?  3  -HP     Standing up from a chair using your arms (e.g., wheelchair, bedside chair)?  3  -HP     Climbing 3-5 steps with a railing?  2  -HP     To walk in hospital room?  3  -HP     AM-PAC 6 Clicks Score (PT)  15  -     Row Name 10/23/20 0942          Functional Assessment    Outcome Measure Options  AM-PAC 6 Clicks Basic Mobility (PT)  -HP       User Key  (r) = Recorded By, (t) = Taken By, (c) = Cosigned By    Initials Name Provider Type    Ron Vargas PT Physical Therapist        Physical Therapy Education                 Title: PT OT SLP Therapies (Done)     Topic: Physical Therapy (Done)     Point: Mobility training (Done)     Learning Progress  Summary           Patient Acceptance, E, VU by HP at 10/23/2020 0943    Acceptance, E,D, VU by ELISA at 10/22/2020 1310    Comment: Educated on safe sequencing with bed mobility, ambulatory transfers, and gait. Reviewed HEP and knee precautions.   Significant Other Acceptance, E,D, VU by ELISA at 10/22/2020 1310    Comment: Educated on safe sequencing with bed mobility, ambulatory transfers, and gait. Reviewed HEP and knee precautions.                   Point: Home exercise program (Done)     Learning Progress Summary           Patient Acceptance, E, VU by HP at 10/23/2020 0943    Acceptance, E,D, VU by ELISA at 10/22/2020 1310    Comment: Educated on safe sequencing with bed mobility, ambulatory transfers, and gait. Reviewed HEP and knee precautions.   Significant Other Acceptance, E,D, VU by ELISA at 10/22/2020 1310    Comment: Educated on safe sequencing with bed mobility, ambulatory transfers, and gait. Reviewed HEP and knee precautions.                   Point: Body mechanics (Done)     Learning Progress Summary           Patient Acceptance, E, VU by HP at 10/23/2020 0943    Acceptance, E,D, VU by ELISA at 10/22/2020 1310    Comment: Educated on safe sequencing with bed mobility, ambulatory transfers, and gait. Reviewed HEP and knee precautions.   Significant Other Acceptance, E,D, VU by ELISA at 10/22/2020 1310    Comment: Educated on safe sequencing with bed mobility, ambulatory transfers, and gait. Reviewed HEP and knee precautions.                   Point: Precautions (Done)     Learning Progress Summary           Patient Acceptance, E, VU by HP at 10/23/2020 0943    Acceptance, E,D, VU by ELISA at 10/22/2020 1310    Comment: Educated on safe sequencing with bed mobility, ambulatory transfers, and gait. Reviewed HEP and knee precautions.   Significant Other Acceptance, E,D, VU by ELISA at 10/22/2020 1310    Comment: Educated on safe sequencing with bed mobility, ambulatory transfers, and gait. Reviewed HEP and knee precautions.                                User Key     Initials Effective Dates Name Provider Type Discipline     09/10/19 -  Emre Rea, PT Physical Therapist PT     09/22/20 -  Ron Arreaga PT Physical Therapist PT              PT Recommendation and Plan     Plan of Care Reviewed With: patient  Progress: improving  Outcome Summary: Pt with severe complaints of pain prior to treatment. Pt CGA with ambulation, VCing required to remain upright and within walker. Knee immobilizer applied in order to prevent knee buckling of L knee. Pt increased distance walked to 100' compared to previous treatment.     Time Calculation:   PT Charges     Row Name 10/23/20 0858             Time Calculation    Start Time  0858  -      PT Received On  10/23/20  -      PT Goal Re-Cert Due Date  11/01/20  -         Time Calculation- PT    Total Timed Code Minutes- PT  23 minute(s)  -         Timed Charges    20730 - PT Therapeutic Exercise Minutes  8  -HP      74628 - Gait Training Minutes   15  -HP        User Key  (r) = Recorded By, (t) = Taken By, (c) = Cosigned By    Initials Name Provider Type     Ron Arreaga, ANDREW Physical Therapist        Therapy Charges for Today     Code Description Service Date Service Provider Modifiers Qty    89848721519 HC PT THER PROC EA 15 MIN 10/23/2020 Ron Arreaga, PT GP 1    66057600634 HC GAIT TRAINING EA 15 MIN 10/23/2020 Ron Arreaga, PT GP 1    69300520235 HC PT THER SUPP EA 15 MIN 10/23/2020 Ron Arreaga, PT GP 2          PT G-Codes  Outcome Measure Options: AM-PAC 6 Clicks Basic Mobility (PT)  AM-PAC 6 Clicks Score (PT): 15    Ron Arreaga PT  10/23/2020

## 2020-10-23 NOTE — PLAN OF CARE
Problem: Adult Inpatient Plan of Care  Goal: Plan of Care Review  Outcome: Ongoing, Progressing  Flowsheets (Taken 10/23/2020 0649)  Plan of Care Reviewed With: patient  Outcome Summary: Patient c/o pain during the night PRN po pain medications given, dressing in place c/d/i and arrow pump in place at rate of 10 was not increased during the night. Patient ambulating with x 1 assist walker and gait belt, possible dc home today.

## 2020-10-23 NOTE — THERAPY EVALUATION
Patient Name: Flory Haas  : 1950    MRN: [id number]                              Today's Date: 10/23/2020       Admit Date: 10/22/2020    Visit Dx:     ICD-10-CM ICD-9-CM   1. Status post total left knee replacement  Z96.652 V43.65     Patient Active Problem List   Diagnosis   • Primary osteoarthritis of left knee   • Status post total left knee replacement   • Hypertension   • Hyperlipidemia   • GERD (gastroesophageal reflux disease)     Past Medical History:   Diagnosis Date   • Arthritis    • GERD (gastroesophageal reflux disease)    • Hyperlipidemia    • Hypertension    • Migraines    • Syncope     about a year ago patient fainted upon standing too quickly. MD informed and did not find cause     Past Surgical History:   Procedure Laterality Date   • BREAST CYST ASPIRATION Right  ??   • COLONOSCOPY      patient reports several years ago she had a colonscopy   • HYSTERECTOMY     • OOPHORECTOMY     • TOTAL KNEE ARTHROPLASTY Left 10/22/2020    Procedure: TOTAL KNEE ARTHROPLASTY LEFT;  Surgeon: Fernandez Enriquez MD;  Location: [facility];  Service: Orthopedics;  Laterality: Left;     General Information     Row Name 10/23/20 1257          OT Time and Intention    Document Type  evaluation  -JY     Mode of Treatment  occupational therapy;individual therapy  -JY     Row Name 10/23/20 1257          General Information    Patient Profile Reviewed  yes  -JY     Prior Level of Function  min assist:;all household mobility;transfer;bed mobility;dressing;bathing;driving;home management;cooking;independent:;feeding;grooming;dependent:;cleaning  -JY     Existing Precautions/Restrictions  fall;other (see comments) adductor nerve catheter L LE  -JY     Barriers to Rehab  none identified  -JY     Row Name 10/23/20 1257          Living Environment    Lives With  significant other  -JY     Row Name 10/23/20 1257          Home Main Entrance    Number of Stairs, Main Entrance  none  -JY     Stair Railings, Main  Entrance  none  -JY     Row Name 10/23/20 1257          Stairs Within Home, Primary    Stairs, Within Home, Primary  0  -JY     Number of Stairs, Within Home, Primary  none  -JY     Stair Railings, Within Home, Primary  none  -JY     Row Name 10/23/20 1257          Cognition    Orientation Status (Cognition)  oriented x 4  -JY     Row Name 10/23/20 1257          Safety Issues, Functional Mobility    Safety Issues Affecting Function (Mobility)  insight into deficits/self-awareness;positioning of assistive device;problem-solving;safety precaution awareness;safety precautions follow-through/compliance;sequencing abilities  -JY     Impairments Affecting Function (Mobility)  endurance/activity tolerance;range of motion (ROM);strength;pain;balance;motor control  -JY     Comment, Safety Issues/Impairments (Mobility)  pt grossly limited by pain at L LE and L knee buckling, recommended use of KI prior to further fxl mobility; increased time advancing LLE at bed for bed mobility  -JY       User Key  (r) = Recorded By, (t) = Taken By, (c) = Cosigned By    Initials Name Provider Type    Denisa Hernandez OT Occupational Therapist        Mobility/ADL's     Row Name 10/23/20 1302          Bed Mobility    Bed Mobility  supine-sit;scooting/bridging  -JY     Scooting/Bridging [place] (Bed Mobility)  minimum assist (75% patient effort);verbal cues  -JY     Supine-Sit [place] (Bed Mobility)  minimum assist (75% patient effort);verbal cues  -JY     Bed Mobility, Safety Issues  decreased use of legs for bridging/pushing  -JY     Assistive Device (Bed Mobility)  bed rails;head of bed elevated  -JY     Comment (Bed Mobility)  pt req'd increased time to advance LE to EOB, limited by pain; utilized HOB elevated and bed rails to assist in uprighting trunk into sitting  -JY     Row Name 10/23/20 1302          Transfers    Transfers  sit-stand transfer;bed-chair transfer  -JY     Comment (Transfers)  skilled cues for optimal hand  placement to control ascend/descend, reaching back prior to sitting and moving LLE out forward prior to sitting for comfort; limited by pain and knee buckling at LLE, recommended KI to be donned prior to further t/f  -JY     Bed-Chair [place] (Transfers)  minimum assist (75% patient effort);verbal cues  -JY     Assistive Device (Bed-Chair Transfers)  walker, front-wheeled  -JY     Sit-Stand [place] (Transfers)  contact guard;verbal cues  -JY     Row Name 10/23/20 1302          Sit-Stand Transfer    Assistive Device (Sit-Stand Transfers)  walker, front-wheeled  -JY     Row Name 10/23/20 1302          Functional Mobility    Functional Mobility- Safety Issues  balance decreased during turns  -JY     Functional Mobility- Comment  fxl mobility limited to stepping associated with bed > chair t/f in which pt presented with L knee buckling and increased pain of which contributed to recommendation for KI prior to further fxl mobility  -JY     Row Name 10/23/20 1302          Activities of Daily Living    BADL Assessment/Intervention  lower body dressing;upper body dressing;bathing;toileting  -JY     Row Name 10/23/20 1302          Mobility    Extremity Weight-bearing Status  left lower extremity  -JY     Left Lower Extremity (Weight-bearing Status)  weight-bearing as tolerated (WBAT)  -JY     Row Name 10/23/20 1302          Lower Body Dressing Assessment/Training    [place] Level (Lower Body Dressing)  doff;don;pants/bottoms;socks;modified independence;verbal cues  -JY     Assistive Devices (Lower Body Dressing)  reacher;leg   -JY     Position (Lower Body Dressing)  unsupported sitting;supported standing  -JY     Comment (Lower Body Dressing)  pt able to grossly d/d pant/undergarments/socks without AE with forward flexion and distal reach, issued leg  to assist with moving LLE more fluidly with decreased pain, effort; issued reacher if pt is d/d more restirictive garments; gross task completion with  MI for increased time; did educate pt on optimal seq for threading  -JY     Row Name 10/23/20 1302          Upper Body Dressing Assessment/Training    [place] Level (Upper Body Dressing)  doff;don;bra/undergarment;pull-over garment;independent;verbal cues  -JY     Position (Upper Body Dressing)  supported sitting  -JY     Row Name 10/23/20 1302          Bathing Assessment/Intervention    [place] Level (Bathing)  lower body;distal lower extremities/feet;not tested  -JY     Assistive Devices (Bathing)  long-handled sponge  -JY     Position (Bathing)  supported sitting  -JY     Comment (Bathing)  OT issued pt LH sponge for more fluid and reduced pain with LBB; did not assess bathing authentically yet pt did demo optimal grasp and reach and verb. purpose  -JY     Row Name 10/23/20 1302          Toileting Assessment/Training    [place] Level (Toileting)  not tested;other (see comments) see comment  -JY     Position (Toileting)  supported standing  -JY     Comment (Toileting)  pt with external catheter and need for KI donned prior to fxl ambulation to bathroom, deferred need to complete t/f to BSC; pt did require min A for upward mgmt of LB garments d/t balance  -JY       User Key  (r) = Recorded By, (t) = Taken By, (c) = Cosigned By    Initials Name Provider Type    Denisa Hernandez OT Occupational Therapist        Obj/Interventions     Row Name 10/23/20 1313          Sensory Assessment (Somatosensory)    Sensory Assessment (Somatosensory)  bilateral UE  -JY     Bilateral UE Sensory Assessment  general sensation;light touch awareness  -JY     Row Name 10/23/20 1313          Sensory Interventions    Comment, Sensory Intervention  pt denies any numbness or tingling at BUEs, able to identify all LT stimuli  -JY     Row Name 10/23/20 1313          Vision Assessment/Intervention    Visual Impairment/Limitations  WFL  -JY     Row Name 10/23/20 1313          Range of Motion Comprehensive    Comment, General  Range of Motion  BUE AROM WFL  -JY     Row Name 10/23/20 1313          Strength Comprehensive (MMT)    General Manual Muscle Testing (MMT) Assessment  no strength deficits identified  -JY     Comment, General Manual Muscle Testing (MMT) Assessment  BUEs grossly 4+/5 per MMT  -JY     Row Name 10/23/20 1313          Balance    Balance Assessment  sitting static balance;sitting dynamic balance;standing static balance;standing dynamic balance  -JY     Static Sitting Balance  WNL;supported;unsupported;sitting in chair  -JY     Dynamic Sitting Balance  WNL;supported;unsupported;sitting in chair;other (see comments) distal reach for ADLs and ROM, MMT  -JY     Static Standing Balance  WFL;supported;standing  -JY     Dynamic Standing Balance  mild impairment;supported;standing;other (see comments) fxl transfer, mobility  -JY     Balance Interventions  sitting;standing;static;dynamic;sit to stand;supported;occupation based/functional task  -JY     Comment, Balance  pt requires FWW and recommended for KI for fxl mobility given balance deficits with LLE pain and knee buckling  -JY       User Key  (r) = Recorded By, (t) = Taken By, (c) = Cosigned By    Initials Name Provider Type    Denisa Hernandez OT Occupational Therapist        Goals/Plan     Row Name 10/23/20 1322          Transfer Goal 1 (OT)    Activity/Assistive Device (Transfer Goal 1, OT)  sit-to-stand/stand-to-sit;bed-to-chair/chair-to-bed;toilet;commode;walker, rolling  -JY     [place] Level/Cues Needed (Transfer Goal 1, OT)  standby assist;verbal cues required  -JY     Time Frame (Transfer Goal 1, OT)  long term goal (LTG);by discharge  -JY     Progress/Outcome (Transfer Goal 1, OT)  goal ongoing  -JY     Row Name 10/23/20 1322          Bathing Goal 1 (OT)    Activity/Device (Bathing Goal 1, OT)  lower body bathing;long-handled sponge;other (see comments) using AE PRN, AD as recommended  -JY     [place] Level/Cues Needed (Bathing Goal 1, OT)  standby  assist;verbal cues required  -JY     Time Frame (Bathing Goal 1, OT)  long term goal (LTG);by discharge  -JY     Progress/Outcomes (Bathing Goal 1, OT)  goal ongoing  -JY     Row Name 10/23/20 1322          Dressing Goal 1 (OT)    Activity/Device (Dressing Goal 1, OT)  lower body dressing;other (see comments) AE PRN, AD as recommended  -JY     [place]/Cues Needed (Dressing Goal 1, OT)  modified independence;verbal cues required;set-up required  -JY     Time Frame (Dressing Goal 1, OT)  long term goal (LTG);by discharge  -JY     Progress/Outcome (Dressing Goal 1, OT)  goal ongoing  -JY     Row Name 10/23/20 1322          Toileting Goal 1 (OT)    Activity/Device (Toileting Goal 1, OT)  adjust/manage clothing;perform perineal hygiene;commode;grab bar/safety frame;raised toilet seat  -JY     [place] Level/Cues Needed (Toileting Goal 1, OT)  standby assist;verbal cues required  -JY     Time Frame (Toileting Goal 1, OT)  long term goal (LTG);by discharge  -JY     Progress/Outcome (Toileting Goal 1, OT)  goal ongoing  -JY       User Key  (r) = Recorded By, (t) = Taken By, (c) = Cosigned By    Initials Name Provider Type    Denisa Hernandez, OT Occupational Therapist        Clinical Impression     Row Name 10/23/20 1314          Pain Assessment    Additional Documentation  Pain Scale: Numbers Pre/Post-Treatment (Group)  -JY     Row Name 10/23/20 5352          Pain Scale: Numbers Pre/Post-Treatment    Pretreatment Pain Rating  8/10  -JY     Posttreatment Pain Rating  9/10  -JY     Pain Location - Side  Left  -JY     Pain Location - Orientation  incisional  -JY     Pain Location  knee  -JY     Pre/Posttreatment Pain Comment  pain increased with mobility  -JY     Pain Intervention(s)  Cold applied;Repositioned;Ambulation/increased activity;Cold pack;Elevated  -JY     Row Name 10/23/20 1149          Plan of Care Review    Plan of Care Reviewed With  patient  -JY     Progress  improving  -JY     Outcome Summary  OT IE  completed post chart review. Pt presents with decreased I in ADLs, related t/f compared to PLOF, grossly limited by pain and knee buckling at LLE once observed upright and completing fxl mobility. Additionally limited by decreased fxl endurance and balance impairments. Pt completed bed mobility with min A for LE advancement, sit < > stand at bed with CGA, min A for fxl t/f to recliner with FWW throughout, further mobility did not progress d/t need for KI. Pt able to d/d socks, pants, undergarments with MI for increased time, requiring no AE. Issued leg , reacher for optimal positioning of LLE and to use for safe reach and to manipulate self care items as needed, further sponge for LBB. Pt would benefit from IPOT POC for further ADL retraining including toileting training as u/a to assess this date. Pt did require min A for balance in upward mgmt of pants. Will recommend home with assistance once medically ready.  -JY     Row Name 10/23/20 1315          Therapy Assessment/Plan (OT)    Patient/Family Therapy Goal Statement (OT)  to increase I in ADLs, related t/f, return to PLOF  -JY     Rehab Potential (OT)  good, to achieve stated therapy goals  -JY     Criteria for Skilled Therapeutic Interventions Met (OT)  yes;skilled treatment is necessary  -JY     Therapy Frequency (OT)  daily  -JY     Row Name 10/23/20 1315          Therapy Plan Review/Discharge Plan (OT)    Equipment Needs Upon Discharge (OT)  bathing equipment;dressing equipment  -JY     Anticipated Discharge Disposition (OT)  home with assist  -JY     Row Name 10/23/20 1315          Vital Signs    Pre Systolic BP Rehab  125  -JY     Pre Treatment Diastolic BP  74  -JY     Post Systolic BP Rehab  111  -JY     Post Treatment Diastolic BP  86  -JY     Pretreatment Heart Rate (beats/min)  89  -JY     Posttreatment Heart Rate (beats/min)  90  -JY     Pre SpO2 (%)  92  -JY     O2 Delivery Pre Treatment  room air  -JY     O2 Delivery Intra Treatment  room  air  -JY     Post SpO2 (%)  94  -JY     O2 Delivery Post Treatment  room air  -JY     Pre Patient Position  Supine  -JY     Intra Patient Position  Standing  -JY     Post Patient Position  Sitting  -JY     Row Name 10/23/20 1315          Positioning and Restraints    Pre-Treatment Position  in bed  -JY     Post Treatment Position  chair  -JY     In Chair  notified nsg;reclined;call light within reach;encouraged to call for assist;exit alarm on;legs elevated  -JY       User Key  (r) = Recorded By, (t) = Taken By, (c) = Cosigned By    Initials Name Provider Type    Denisa Hernandez OT Occupational Therapist        Outcome Measures     Row Name 10/23/20 1324          How much help from another is currently needed...    Putting on and taking off regular lower body clothing?  3  -JY     Bathing (including washing, rinsing, and drying)  3  -JY     Toileting (which includes using toilet bed pan or urinal)  2  -JY     Putting on and taking off regular upper body clothing  4  -JY     Taking care of personal grooming (such as brushing teeth)  3  -JY     Eating meals  4  -JY     AM-PAC 6 Clicks Score (OT)  19  -JY     Row Name 10/23/20 1324          Functional Assessment    Outcome Measure Options  AM-PAC 6 Clicks Daily Activity (OT)  -JY       User Key  (r) = Recorded By, (t) = Taken By, (c) = Cosigned By    Initials Name Provider Type    Denisa Hernandez OT Occupational Therapist        Occupational Therapy Education                 Title: PT OT SLP Therapies (In Progress)     Topic: Occupational Therapy (In Progress)     Point: ADL training (In Progress)     Description:   Instruct learner(s) on proper safety adaptation and remediation techniques during self care or transfers.   Instruct in proper use of assistive devices.              Learning Progress Summary           Patient Acceptance, E,D, NR by CHAI at 10/23/2020 0813                   Point: Precautions (In Progress)     Description:   Instruct learner(s) on  prescribed precautions during self-care and functional transfers.              Learning Progress Summary           Patient Acceptance, E,D, NR by CHAI at 10/23/2020 0813                   Point: Body mechanics (In Progress)     Description:   Instruct learner(s) on proper positioning and spine alignment during self-care, functional mobility activities and/or exercises.              Learning Progress Summary           Patient Acceptance, E,D, NR by LISALANDON at 10/23/2020 0813                               User Key     Initials Effective Dates Name Provider Type Discipline    CHAI 01/29/20 -  Denisa Horton OT Occupational Therapist OT              OT Recommendation and Plan  Therapy Frequency (OT): daily  Plan of Care Review  Plan of Care Reviewed With: patient  Progress: improving  Outcome Summary: OT IE completed post chart review. Pt presents with decreased I in ADLs, related t/f compared to PLOF, grossly limited by pain and knee buckling at LLE once observed upright and completing fxl mobility. Additionally limited by decreased fxl endurance and balance impairments. Pt completed bed mobility with min A for LE advancement, sit < > stand at bed with CGA, min A for fxl t/f to recliner with FWW throughout, further mobility did not progress d/t need for KI. Pt able to d/d socks, pants, undergarments with MI for increased time, requiring no AE. Issued leg , reacher for optimal positioning of LLE and to use for safe reach and to manipulate self care items as needed, further sponge for LBB. Pt would benefit from IPOT POC for further ADL retraining including toileting training as u/a to assess this date. Pt did require min A for balance in upward mgmt of pants. Will recommend home with assistance once medically ready.     Time Calculation:   Time Calculation- OT     Row Name 10/23/20 1327 10/23/20 0858          Time Calculation- OT    OT Start Time  0813 -JY  --     OT Received On  10/23/20  -JY  --     OT Goal Re-Cert Due  Date  11/02/20  -CHAI  --        Timed Charges    34274 - Gait Training Minutes   --  15  -     80196 - OT Self Care/Mgmt Minutes  14  -JLANDON  --       User Key  (r) = Recorded By, (t) = Taken By, (c) = Cosigned By    Initials Name Provider Type    Denisa Hernandez, OT Occupational Therapist    HP Ron Arreaga, PT Physical Therapist        Therapy Charges for Today     Code Description Service Date Service Provider Modifiers Qty    55872328437 HC OT SELF CARE/MGMT/TRAIN EA 15 MIN 10/23/2020 Denisa Horton OT GO 1    [id number]  OT EVAL MOD COMPLEXITY 4 10/23/2020 Denisa Horton OT GO 1               Denisa Horton OT  10/23/2020

## 2020-10-23 NOTE — PROGRESS NOTES
[facility]    Acute pain service Inpatient Progress Note    Patient Name: Flory Haas  :  1950  MRN:  [id number]        Acute Pain  Service Inpatient Progress Note:    Analgesia:Good  Pain Score:0/10  LOC: alert and awake  Resp Status: room air  Cardiac: VS stable  Side Effects:None  Catheter Site:clean, dressing intact and dry  Cath type: peripheral nerve cath with ON Q  Infusion rate: 10ml/hr  Dosing/Volume: ropivacaine 0.2%  Catheter Plan:Catheter to remain Insitu and Continue catheter infusion rate unchanged  Comments: ---------------------------------------------------------------------------------  Physical Therapy Manual Muscle Testing Results:   ]    Physical Therapy - Plan of Care Review - Outcome Summary:  Outcome Summary: Pt was able to tolerate an increased distance ambulated to 250' with RW and CGA. VCing required in order for pt to remain upright, within walker, and to push walker with ambulation. Pt able to demonstrate ability to perform STS transfer from toilet seat. Recommend pt d/c home with assistance and use of KI to ensure no knee buckling until strength regains. (10/23/20 3225)]    Occupational Therapy - Plan of Care Review - Outcome Summary:  Outcome Summary: OT IE completed post chart review. Pt presents with decreased I in ADLs, related t/f compared to PLOF, grossly limited by pain and knee buckling at LLE once observed upright and completing fxl mobility. Additionally limited by decreased fxl endurance and balance impairments. Pt completed bed mobility with min A for LE advancement, sit < > stand at bed with CGA, min A for fxl t/f to recliner with FWW throughout, further mobility did not progress d/t need for KI. Pt able to d/d socks, pants, undergarments with MI for increased time, requiring no AE. Issued leg , reacher for optimal positioning of LLE and to use for safe reach and to manipulate self care items as needed, further sponge for LBB. Pt would benefit from IPOT  POC for further ADL retraining including toileting training as u/a to assess this date. Pt did require min A for balance in upward mgmt of pants. Will recommend home with assistance once medically ready. (10/23/20 9417)]  ----------------------------------------------------------------------------------

## 2020-10-25 NOTE — PROGRESS NOTES
[facility]    Nerve Cath Post Op Call    Patient Name: Flory Haas  :  1950  MRN:  [id number]  Date of Discharge: 10/23/2020    Nerve Cath Post Op Call:    Catheter Plan:Patient/Family member report nerve catheter previously discontinued, tip intact  Patient/Family instructed to call ON CALL anesthesia provider for any questions or problems.  Patient Follow Up:

## 2020-11-02 DIAGNOSIS — Z96.652 S/P TKR (TOTAL KNEE REPLACEMENT), LEFT: Primary | ICD-10-CM

## 2020-11-02 RX ORDER — OXYCODONE HYDROCHLORIDE 5 MG/1
10 TABLET ORAL EVERY 4 HOURS PRN
Qty: 30 TABLET | Refills: 0 | Status: SHIPPED | OUTPATIENT
Start: 2020-11-02

## 2021-09-09 ENCOUNTER — TRANSCRIBE ORDERS (OUTPATIENT)
Dept: PHYSICAL THERAPY | Facility: CLINIC | Age: 71
End: 2021-09-09

## 2021-09-09 ENCOUNTER — TREATMENT (OUTPATIENT)
Dept: PHYSICAL THERAPY | Facility: CLINIC | Age: 71
End: 2021-09-09

## 2021-09-09 DIAGNOSIS — Z47.89 ORTHOPEDIC AFTERCARE: ICD-10-CM

## 2021-09-09 DIAGNOSIS — M18.11 PRIMARY OSTEOARTHRITIS OF FIRST CARPOMETACARPAL JOINT OF RIGHT HAND: Primary | ICD-10-CM

## 2021-09-09 DIAGNOSIS — M25.531 RIGHT WRIST PAIN: ICD-10-CM

## 2021-09-09 PROCEDURE — L3906 WHO W/O JOINTS CF: HCPCS | Performed by: PHYSICAL THERAPIST

## 2021-09-09 NOTE — PROGRESS NOTES
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 S/P 1st CMC arthroplasty.  They have been sent to PT for post-operative splinting.  Medical/Vocational History/ Medications: Retired. PMH, see Epic and MD notes    Pain: Volar wrist    Edema: mild to moderate at radial side of wrist/thumb on right  Sensibility: WNL   Wound Status:Eschar present dorsal 1st CMC joint  ROM/ Strength: Not assessed due to surgical precautions.    Splinting:  · Patient was measure and fit with a custom fabricated volar forearm based wrist/hand/thumb immobilization splint with IP joint free.   · Patient was instructed in wearing schedule, precautions and care of the splint during this visit.   · Patient was instructed in proper donning/doffing of splint.   Assessment:  · Patient was fitted and appropriate splint was fabricated this date.  · Patient reported that splint was comfortable and had no complications with the fit of the splint.  · Patient was instructed and patient verbalized understanding of precautions, wear and care of the splint.   · Patient demonstrated independent donning/doffing of splint during treatment today.  Goals:  · Patient was fitted properly with appropriate splint for diagnosis  · Patient was educated on precautions, wear schedule and care of splint  · Patient demonstrated independence with donning/doffing of the splint.  · Splint was provided to Protect Healing Structures, Restrict Mobility, Improve joint alignment.  Plan:  · No additional treatment is required for this patient at this time. The patient is therefore discharged from therapy.  · Patient advised to contact therapist with any additional questions or concerns regarding the fit and function of the  splint.  · Patient will be seen for splint issues as needed   · Wear Instructions: Off for hygiene       PT SIGNATURE: Rob Esquivel PT, T  DATE TREATMENT INITIATED: 9/9/2021

## 2022-02-01 ENCOUNTER — TRANSCRIBE ORDERS (OUTPATIENT)
Dept: ADMINISTRATIVE | Facility: HOSPITAL | Age: 72
End: 2022-02-01

## 2022-02-01 DIAGNOSIS — Z12.31 VISIT FOR SCREENING MAMMOGRAM: Primary | ICD-10-CM

## 2022-03-21 ENCOUNTER — HOSPITAL ENCOUNTER (OUTPATIENT)
Dept: MAMMOGRAPHY | Facility: HOSPITAL | Age: 72
Discharge: HOME | End: 2022-03-21
Admitting: INTERNAL MEDICINE

## 2022-03-21 DIAGNOSIS — Z12.31 VISIT FOR SCREENING MAMMOGRAM: ICD-10-CM

## 2022-03-21 PROCEDURE — 77067 SCR MAMMO BI INCL CAD: CPT | Performed by: RADIOLOGY

## 2022-03-21 PROCEDURE — 77063 BREAST TOMOSYNTHESIS BI: CPT

## 2022-03-21 PROCEDURE — 77067 SCR MAMMO BI INCL CAD: CPT

## 2022-03-21 PROCEDURE — 77063 BREAST TOMOSYNTHESIS BI: CPT | Performed by: RADIOLOGY

## 2022-05-04 ENCOUNTER — HOSPITAL ENCOUNTER (OUTPATIENT)
Dept: GENERAL RADIOLOGY | Facility: HOSPITAL | Age: 72
Discharge: HOME | End: 2022-05-04
Admitting: STUDENT IN AN ORGANIZED HEALTH CARE EDUCATION/TRAINING PROGRAM

## 2022-05-04 ENCOUNTER — TRANSCRIBE ORDERS (OUTPATIENT)
Dept: ADMINISTRATIVE | Facility: HOSPITAL | Age: 72
End: 2022-05-04

## 2022-05-04 DIAGNOSIS — M54.2 CERVICALGIA: Primary | ICD-10-CM

## 2022-05-04 PROCEDURE — 72040 X-RAY EXAM NECK SPINE 2-3 VW: CPT

## 2022-05-23 ENCOUNTER — TRANSCRIBE ORDERS (OUTPATIENT)
Dept: ADMINISTRATIVE | Facility: HOSPITAL | Age: 72
End: 2022-05-23

## 2022-05-23 DIAGNOSIS — R22.41 KNEE MASS, RIGHT: Primary | ICD-10-CM

## 2023-03-23 ENCOUNTER — TRANSCRIBE ORDERS (OUTPATIENT)
Dept: LAB | Facility: HOSPITAL | Age: 73
End: 2023-03-23
Payer: MEDICARE

## 2023-03-23 ENCOUNTER — LAB (OUTPATIENT)
Dept: LAB | Facility: HOSPITAL | Age: 73
End: 2023-03-23
Payer: MEDICARE

## 2023-03-23 DIAGNOSIS — L08.0 PYODERMA: ICD-10-CM

## 2023-03-23 DIAGNOSIS — L08.0 PYODERMA: Primary | ICD-10-CM

## 2023-03-23 PROCEDURE — 87147 CULTURE TYPE IMMUNOLOGIC: CPT

## 2023-03-23 PROCEDURE — 87070 CULTURE OTHR SPECIMN AEROBIC: CPT

## 2023-03-23 PROCEDURE — 87205 SMEAR GRAM STAIN: CPT

## 2023-03-23 PROCEDURE — 87186 SC STD MICRODIL/AGAR DIL: CPT

## 2023-03-25 LAB
BACTERIA SPEC AEROBE CULT: ABNORMAL
GRAM STN SPEC: ABNORMAL

## 2023-04-24 ENCOUNTER — TRANSCRIBE ORDERS (OUTPATIENT)
Dept: ADMINISTRATIVE | Facility: HOSPITAL | Age: 73
End: 2023-04-24
Payer: MEDICARE

## 2023-04-24 DIAGNOSIS — Z12.31 VISIT FOR SCREENING MAMMOGRAM: Primary | ICD-10-CM

## 2023-05-10 ENCOUNTER — HOSPITAL ENCOUNTER (OUTPATIENT)
Dept: MAMMOGRAPHY | Facility: HOSPITAL | Age: 73
Discharge: HOME | End: 2023-05-10
Admitting: INTERNAL MEDICINE
Payer: MEDICARE

## 2023-05-10 DIAGNOSIS — Z12.31 VISIT FOR SCREENING MAMMOGRAM: ICD-10-CM

## 2023-05-10 PROCEDURE — 77067 SCR MAMMO BI INCL CAD: CPT

## 2023-05-10 PROCEDURE — 77063 BREAST TOMOSYNTHESIS BI: CPT

## 2024-06-17 ENCOUNTER — TRANSCRIBE ORDERS (OUTPATIENT)
Dept: ADMINISTRATIVE | Facility: HOSPITAL | Age: 74
End: 2024-06-17
Payer: MEDICARE

## 2024-06-17 DIAGNOSIS — Z12.31 ENCOUNTER FOR SCREENING MAMMOGRAM FOR BREAST CANCER: Primary | ICD-10-CM

## 2024-06-24 ENCOUNTER — HOSPITAL ENCOUNTER (OUTPATIENT)
Dept: MAMMOGRAPHY | Facility: HOSPITAL | Age: 74
Discharge: HOME | End: 2024-06-24
Admitting: INTERNAL MEDICINE
Payer: MEDICARE

## 2024-06-24 DIAGNOSIS — Z12.31 ENCOUNTER FOR SCREENING MAMMOGRAM FOR BREAST CANCER: ICD-10-CM

## 2024-06-24 PROCEDURE — 77067 SCR MAMMO BI INCL CAD: CPT

## 2024-06-24 PROCEDURE — 77063 BREAST TOMOSYNTHESIS BI: CPT

## 2024-06-25 PROCEDURE — 77063 BREAST TOMOSYNTHESIS BI: CPT | Performed by: RADIOLOGY

## 2024-06-25 PROCEDURE — 77067 SCR MAMMO BI INCL CAD: CPT | Performed by: RADIOLOGY

## 2025-05-13 ENCOUNTER — TRANSCRIBE ORDERS (OUTPATIENT)
Dept: ADMINISTRATIVE | Facility: HOSPITAL | Age: 75
End: 2025-05-13
Payer: COMMERCIAL

## 2025-05-13 DIAGNOSIS — Z12.31 VISIT FOR SCREENING MAMMOGRAM: Primary | ICD-10-CM

## 2025-06-25 ENCOUNTER — HOSPITAL ENCOUNTER (OUTPATIENT)
Dept: MAMMOGRAPHY | Facility: HOSPITAL | Age: 75
Discharge: HOME | End: 2025-06-25
Admitting: INTERNAL MEDICINE
Payer: MEDICARE

## 2025-06-25 DIAGNOSIS — Z12.31 VISIT FOR SCREENING MAMMOGRAM: ICD-10-CM

## 2025-06-25 PROCEDURE — 77063 BREAST TOMOSYNTHESIS BI: CPT

## 2025-06-25 PROCEDURE — 77067 SCR MAMMO BI INCL CAD: CPT

## (undated) DEVICE — MEDI-VAC YANKAUER SUCTION HANDLE W/BULBOUS TIP: Brand: CARDINAL HEALTH

## (undated) DEVICE — GLV SURG PREMIERPRO MIC LTX PF SZ8 BRN

## (undated) DEVICE — BNDG ELAS W/CLIP 6IN 10YD LF STRL

## (undated) DEVICE — STRYKER PERFORMANCE SERIES SAGITTAL BLADE: Brand: STRYKER PERFORMANCE SERIES

## (undated) DEVICE — Device

## (undated) DEVICE — NEEDLE, QUINCKE, 18GX3.5": Brand: MEDLINE

## (undated) DEVICE — PK KN TOTL 10

## (undated) DEVICE — DRSNG PAD ABD 8X10IN STRL

## (undated) DEVICE — CVR HNDL LIGHT RIGID

## (undated) DEVICE — SYS SKIN CLS DERMABOND PRINEO W/22CM MESH TP

## (undated) DEVICE — ANTIBACTERIAL UNDYED BRAIDED (POLYGLACTIN 910), SYNTHETIC ABSORBABLE SUTURE: Brand: COATED VICRYL

## (undated) DEVICE — STERILE PVP: Brand: MEDLINE INDUSTRIES, INC.

## (undated) DEVICE — UNDERCAST PADDING: Brand: DEROYAL

## (undated) DEVICE — TUBING, SUCTION, 1/4" X 10', STRAIGHT: Brand: MEDLINE

## (undated) DEVICE — NDL HYPO ECLPS SFTY 18G 1 1/2IN

## (undated) DEVICE — SYR LUERLOK 30CC

## (undated) DEVICE — 3 BONE CEMENT MIXER: Brand: MIXEVAC

## (undated) DEVICE — PUMP PAIN AUTOFUSER AUTO SELCT NOBOLUS 1TO14ML/HR 550ML DISP